# Patient Record
Sex: FEMALE | Race: WHITE | Employment: UNEMPLOYED | URBAN - METROPOLITAN AREA
[De-identification: names, ages, dates, MRNs, and addresses within clinical notes are randomized per-mention and may not be internally consistent; named-entity substitution may affect disease eponyms.]

---

## 2018-03-06 ENCOUNTER — SURG/PROC ORDERS (OUTPATIENT)
Dept: SURGERY | Age: 38
End: 2018-03-06

## 2018-03-06 ENCOUNTER — INITIAL CONSULT (OUTPATIENT)
Dept: SURGERY | Age: 38
End: 2018-03-06

## 2018-03-06 VITALS
HEIGHT: 64 IN | BODY MASS INDEX: 37.9 KG/M2 | SYSTOLIC BLOOD PRESSURE: 120 MMHG | WEIGHT: 222 LBS | DIASTOLIC BLOOD PRESSURE: 72 MMHG

## 2018-03-06 DIAGNOSIS — K80.20 GALLSTONES: Primary | ICD-10-CM

## 2018-03-06 PROCEDURE — 99203 OFFICE O/P NEW LOW 30 MIN: CPT | Performed by: SURGERY

## 2018-03-06 PROCEDURE — G8417 CALC BMI ABV UP PARAM F/U: HCPCS | Performed by: SURGERY

## 2018-03-06 PROCEDURE — G8484 FLU IMMUNIZE NO ADMIN: HCPCS | Performed by: SURGERY

## 2018-03-06 PROCEDURE — G8427 DOCREV CUR MEDS BY ELIG CLIN: HCPCS | Performed by: SURGERY

## 2018-03-06 RX ORDER — HEPARIN SODIUM 5000 [USP'U]/ML
5000 INJECTION, SOLUTION INTRAVENOUS; SUBCUTANEOUS ONCE
Status: CANCELLED | OUTPATIENT
Start: 2018-03-06

## 2018-03-06 RX ORDER — SODIUM CHLORIDE 0.9 % (FLUSH) 0.9 %
10 SYRINGE (ML) INJECTION EVERY 12 HOURS SCHEDULED
Status: CANCELLED | OUTPATIENT
Start: 2018-03-06

## 2018-03-06 RX ORDER — SODIUM CHLORIDE 0.9 % (FLUSH) 0.9 %
10 SYRINGE (ML) INJECTION PRN
Status: CANCELLED | OUTPATIENT
Start: 2018-03-06

## 2018-03-19 NOTE — PROGRESS NOTES
Endoscopy Procedure Note    Patient: Bull Bateman MRN: [id number]  SSN: [id number]    YOB: 1963  Age: 52 y.o. Sex: female      Date/Time:  1/17/2017 9:05 AM    Esophagogastroduodenoscopy (EGD) Procedure Note    Procedure: Esophagogastroduodenoscopy with biopsy    IMPRESSION:   1. Mild gastritis or gastropathy changes. Biopsies taken for H. Pylori. 2. Otherwise normal post Christian-en-Y gastric bypass anatomy. RECOMMENDATIONS:  1. Resume regular diet. 2. Continue Protonix. 3. Avoid Alcohol and NSAIDs. 4. Will contact with biopsy results in 2 weeks. Indication: Vomiting, persitent of unclear etilogy  :  Demond Edwards MD  Assistants: Endoscopy Technician-1: Karly Garza  Endoscopy RN-1: Patricia Raygoza RN    Referring Provider:   Heidi Fitzpatrick MD  History: The history and physical exam were reviewed and updated. Endoscope: Olympus GIF-190 diagnostic endoscope  Extent of Exam: proximal jejunum  ASA: ASA 3 - Patient with moderate systemic disease with functional limitations  Anethesia/Sedation:  MAC anesthesia    Description of the procedure: The procedure was discussed with the patient including risks, benefits, alternatives including risks of iv sedation, bleeding, perforation and aspiration. A safety timeout was performed. The patient was placed in the left lateral decubitus position. A bite block was placed. The patient was given incremental doses of intravenous sedation until moderate sedation was achieved. The patients vital signs were monitored at all times including heart rate/rhythm, blood pressure and oxygen saturation. The endoscope was then passed under direct visualization to the proximal jejunum. The endoscope was then slowly withdrawn while visualizing the mucosa. In the stomach a retroflexion was performed and gastric fundus and cardia visualized. The endoscope was then slowly withdrawn.   The patient was then transferred to recovery Obstructive Sleep Apnea (DOROTA) Screening     Patient:  Alvin Leos    YOB: 1980      Medical Record #:  [id number]                     Date:  3/19/2018     1. Are you a loud and/or regular snorer? []  Yes       [x] No    2. Have you been observed to gasp or stop breathing during sleep? []  Yes       [x] No    3. Do you feel tired or groggy upon awakening or do you awaken with a headache?           []  Yes       [x] No    4. Are you often tired or fatigued during the wake time hours? []  Yes       [x] No    5. Do you fall asleep sitting, reading, watching TV or driving? []  Yes       [x] No    6. Do you often have problems with memory or concentration? []  Yes       [x] No    **If patient's score is ? 3 they are considered high risk for DOROTA. Notify the anesthesiologist of the high risk and document in focus note. Note:  If the patient's BMI is more than 35 kg m¯² , has neck circumference > 40 cm, and/or high blood pressure the risk is greater (© American Sleep Apnea Association, 2006). in stable condition. Findings:    Esophagus: The esophageal mucosa was normal with no ulceration, mass or stricture. There was no evidence of Jean's esophagus or reflux esophagitis. Stomach: There was anatomy consistent with a Christian-en-Y gastric bypass. There was mild edema of the gastric mucosa. Biopsies taken for H. Pylori. No ulceration, mass, stricture. The Gastrojejunal anastomosis appeared normal with no ulceration, mass or stricture. Jejunum: The duodenum mucosa was normal with no ulceration, mass, stricture and no evidence of villous atrophy. Therapies:  None    Specimens:   ID Type Source Tests Collected by Time Destination   1 : Bx (cold forcep) r/o H Pylori Preservative Gastric  Alysa Antonio MD 1/17/2017 0092 Pathology               Complications:   None; patient tolerated the procedure well. EBL:None    Discharge disposition:  Home in the company of  when able to ambulate    Alysa Antonio MD  January 17, 2017  9:05 AM          Colonoscopy Report    Patient: Mis Chavarria MRN: [id number]  SSN: [id number]    YOB: 1963  Age: 52 y.o. Sex: female      Date of Procedure: 1/17/2017    IMPRESSION:  1. Internal hemorrhoids. 2. Otherwise normal colonoscopy including terminal ileum evaluation. RECOMMENDATIONS:  1. Resume regular diet, Recommend high fiber. 2. Repeat colonoscopy in 5 years.      Indication:  Personal history of colon polyps (screening only)  Procedure Performed: Colonoscopy   Endoscopist: Alysa Antonio MD  Assistant: Endoscopy Technician-1: Narinder Zafar  Endoscopy RN-1: Jones Weeks RN  ASA: ASA 3 - Patient with moderate systemic disease with functional limitations  Mallampati Score: II (soft palate, uvula, fauces visible)  Anesthesia: MAC anesthesia  Endoscope:     [x]  CF H 190AL   []  PCF H190AL   []  GIF H 190    Extent of Examination:terminal ileum  Quality of Preparation:     []  Excellent   [x]  Very Good   [] Fair but adequate   [] Fair, inadequate   []  Poor      Technique: The procedure was discussed with the patient including risks, benefits, alternatives including risks of IV sedation, bleeding, perforation and missed polyp. A safety timeout was performed. The patient was given incremental doses of intravenous sedation to achieve moderate sedation. The patients vital signs were monitored at all times including heart rate, rhythm, blood pressure and oxygen saturation. The patient was placed in left lateral position. When adequate sedation was achieved a perianal inspection and a digital rectal exam were performed. Video colonoscope was introduced into the rectum and advanced under direct vision up to the terminal ileum. The cecum was identified by IC valve, appendiceal orifice and crows foot. With adequate insufflation and maneuvering of the withdrawing scope, the colonic mucosa was visualized carefully. Retroflexion was performed in the rectum and the distal rectum visualized. The patient tolerated the procedure very well and was transferred to recovery area. Findings:  1. Normal rectal exam.   2. Normal terminal ileum with no ulceration, mass, stricture. 3. There were small hemorrhoids in the distal rectum. 4. The colonic mucosa was otherwise normal with no polyps, masses, ulcerations, stricture.        EBL:None  Specimen:   ID Type Source Tests Collected by Time Destination   1 : Bx (cold forcep) r/o H Pylori Preservative Gastric  North Antoine MD 1/17/2017 8953 Pathology       North Antoine MD  January 17, 2017  9:08 AM

## 2018-03-23 ENCOUNTER — HOSPITAL ENCOUNTER (OUTPATIENT)
Dept: SURGERY | Age: 38
Discharge: HOME | End: 2018-03-23
Attending: SURGERY | Admitting: SURGERY

## 2018-03-23 VITALS
RESPIRATION RATE: 15 BRPM | TEMPERATURE: 97.6 F | OXYGEN SATURATION: 93 % | DIASTOLIC BLOOD PRESSURE: 83 MMHG | SYSTOLIC BLOOD PRESSURE: 128 MMHG | BODY MASS INDEX: 37.9 KG/M2 | HEIGHT: 64 IN | WEIGHT: 222 LBS | HEART RATE: 77 BPM

## 2018-03-23 DIAGNOSIS — K80.20 GALLSTONES: Primary | ICD-10-CM

## 2018-03-23 PROCEDURE — 47562 LAPAROSCOPIC CHOLECYSTECTOMY: CPT | Performed by: SURGERY

## 2018-03-23 RX ORDER — OXYCODONE HYDROCHLORIDE 5 MG/1
5 TABLET ORAL EVERY 6 HOURS PRN
Qty: 28 TABLET | Refills: 0 | Status: SHIPPED | OUTPATIENT
Start: 2018-03-23 | End: 2018-03-30

## 2018-03-23 RX ORDER — LIDOCAINE HYDROCHLORIDE 10 MG/ML
INJECTION, SOLUTION EPIDURAL; INFILTRATION; INTRACAUDAL; PERINEURAL
Status: DISPENSED
Start: 2018-03-23 | End: 2018-03-23

## 2018-03-23 RX ORDER — FENTANYL CITRATE 50 UG/ML
25 INJECTION, SOLUTION INTRAMUSCULAR; INTRAVENOUS EVERY 5 MIN PRN
Status: DISCONTINUED | OUTPATIENT
Start: 2018-03-23 | End: 2018-03-24 | Stop reason: HOSPADM

## 2018-03-23 RX ORDER — HYDRALAZINE HYDROCHLORIDE 20 MG/ML
5 INJECTION INTRAMUSCULAR; INTRAVENOUS EVERY 10 MIN PRN
Status: DISCONTINUED | OUTPATIENT
Start: 2018-03-23 | End: 2018-03-24 | Stop reason: HOSPADM

## 2018-03-23 RX ORDER — ACETAMINOPHEN 10 MG/ML
1000 INJECTION, SOLUTION INTRAVENOUS ONCE
Status: DISCONTINUED | OUTPATIENT
Start: 2018-03-23 | End: 2018-03-24 | Stop reason: HOSPADM

## 2018-03-23 RX ORDER — CELECOXIB 100 MG/1
100 CAPSULE ORAL ONCE
Status: COMPLETED | OUTPATIENT
Start: 2018-03-23 | End: 2018-03-23

## 2018-03-23 RX ORDER — HEPARIN SODIUM 5000 [USP'U]/ML
5000 INJECTION, SOLUTION INTRAVENOUS; SUBCUTANEOUS ONCE
Status: COMPLETED | OUTPATIENT
Start: 2018-03-23 | End: 2018-03-23

## 2018-03-23 RX ORDER — SODIUM CHLORIDE, SODIUM LACTATE, POTASSIUM CHLORIDE, CALCIUM CHLORIDE 600; 310; 30; 20 MG/100ML; MG/100ML; MG/100ML; MG/100ML
INJECTION, SOLUTION INTRAVENOUS CONTINUOUS
Status: DISCONTINUED | OUTPATIENT
Start: 2018-03-23 | End: 2018-03-24 | Stop reason: HOSPADM

## 2018-03-23 RX ORDER — PROMETHAZINE HYDROCHLORIDE 25 MG/ML
6.25 INJECTION, SOLUTION INTRAMUSCULAR; INTRAVENOUS
Status: ACTIVE | OUTPATIENT
Start: 2018-03-23 | End: 2018-03-23

## 2018-03-23 RX ORDER — ACETAMINOPHEN 10 MG/ML
INJECTION, SOLUTION INTRAVENOUS
Status: DISPENSED
Start: 2018-03-23 | End: 2018-03-23

## 2018-03-23 RX ORDER — FENTANYL CITRATE 50 UG/ML
50 INJECTION, SOLUTION INTRAMUSCULAR; INTRAVENOUS EVERY 5 MIN PRN
Status: DISCONTINUED | OUTPATIENT
Start: 2018-03-23 | End: 2018-03-24 | Stop reason: HOSPADM

## 2018-03-23 RX ORDER — SODIUM CHLORIDE 0.9 % (FLUSH) 0.9 %
10 SYRINGE (ML) INJECTION EVERY 12 HOURS SCHEDULED
Status: DISCONTINUED | OUTPATIENT
Start: 2018-03-23 | End: 2018-03-24 | Stop reason: HOSPADM

## 2018-03-23 RX ORDER — SODIUM CHLORIDE 0.9 % (FLUSH) 0.9 %
10 SYRINGE (ML) INJECTION PRN
Status: DISCONTINUED | OUTPATIENT
Start: 2018-03-23 | End: 2018-03-24 | Stop reason: HOSPADM

## 2018-03-23 RX ORDER — LABETALOL HYDROCHLORIDE 5 MG/ML
5 INJECTION, SOLUTION INTRAVENOUS EVERY 10 MIN PRN
Status: DISCONTINUED | OUTPATIENT
Start: 2018-03-23 | End: 2018-03-24 | Stop reason: HOSPADM

## 2018-03-23 RX ORDER — ONDANSETRON 2 MG/ML
4 INJECTION INTRAMUSCULAR; INTRAVENOUS
Status: ACTIVE | OUTPATIENT
Start: 2018-03-23 | End: 2018-03-23

## 2018-03-23 RX ADMIN — SODIUM CHLORIDE, SODIUM LACTATE, POTASSIUM CHLORIDE, CALCIUM CHLORIDE: 600; 310; 30; 20 INJECTION, SOLUTION INTRAVENOUS at 07:14

## 2018-03-23 RX ADMIN — Medication 0.25 MG: at 08:55

## 2018-03-23 RX ADMIN — Medication 0.25 MG: at 08:41

## 2018-03-23 RX ADMIN — Medication 0.25 MG: at 08:48

## 2018-03-23 RX ADMIN — HEPARIN SODIUM 5000 UNITS: 5000 INJECTION, SOLUTION INTRAVENOUS; SUBCUTANEOUS at 07:14

## 2018-03-23 RX ADMIN — CELECOXIB 100 MG: 100 CAPSULE ORAL at 07:21

## 2018-03-23 RX ADMIN — Medication 0.25 MG: at 08:27

## 2018-03-23 ASSESSMENT — PAIN DESCRIPTION - ORIENTATION: ORIENTATION: RIGHT;UPPER

## 2018-03-23 ASSESSMENT — PAIN DESCRIPTION - LOCATION: LOCATION: ABDOMEN

## 2018-03-23 ASSESSMENT — PAIN SCALES - GENERAL
PAINLEVEL_OUTOF10: 4
PAINLEVEL_OUTOF10: 5
PAINLEVEL_OUTOF10: 6
PAINLEVEL_OUTOF10: 6
PAINLEVEL_OUTOF10: 0
PAINLEVEL_OUTOF10: 6

## 2018-03-23 ASSESSMENT — PAIN DESCRIPTION - DESCRIPTORS: DESCRIPTORS: SORE

## 2018-03-23 ASSESSMENT — PAIN DESCRIPTION - PAIN TYPE: TYPE: SURGICAL PAIN

## 2018-03-23 ASSESSMENT — PAIN - FUNCTIONAL ASSESSMENT: PAIN_FUNCTIONAL_ASSESSMENT: 0-10

## 2018-03-23 NOTE — H&P
I have reviewed the progress note serving as history and physical dated March/6/ 2018 and examined the patient and find no relevant changes. I have reviewed with the patient and/or family the risks, benefits, and alternatives to the procedure.

## 2018-03-23 NOTE — PLAN OF CARE
Phase I (PACU)  1. Patient is identified using name and the date of birth. 2.  The patient is free from signs and symptoms of chemical, electrical, laser, radiation, positioning, or transfer/transport injury. 3.  The patient receives appropriate medication(s), safely administered during the Perioperative period. 4.  The patient has wound/tissue perfusion consistent with or improved from baseline levels established preoperatively. 5.  The patient is at or returning to normothermia at the conclusion of the immediate postoperative period. 6.  The patient's fluid, electrolyte, and acid base balances are consistent with or improved from baseline levels established preoperatively. 7.  The patient's pulmonary function is consistent with or improved from baseline levels established preoperatively. 8.  The patient's cardiovascular status is consistent with or improved from baseline levels established preoperatively. 9.  The patient/caregiver participates in decisions affecting his or her Perioperative plan of care. 10. The patient's care is consistent with the individualized Perioperative plan of care. 11. The patient's right to privacy is maintained. 12. The patient is the recipient of competent and ethical care within legal standards of practice. 13.  The patient's value system, lifestyle, ethnicity, and culture are considered, respected, and incorporated in the Perioperative plan of care. 14.  The patient demonstrates and/or reports adequate pain control throughout the the Perioperative period. 15. The patient's neurological status is consistent with or improved from baseline levels established preoperatively. 16.  The patient/caregiver demonstrates knowledge of the expected responses to the operative or invasive procedure. 17.  Patient/Caregiver has reduced anxiety. Interventions- Familiarize with environment and equipment.   18.  Other:  19.Other:
Pre-Operative:  1. Patient/Caregiver identifies - states name and date of birth. 2.  The patient is free from signs and symptoms of injury. 3.  The patient receives appropriate medication(s), safely administered during the Perioperative period. 4.  The patient is free from signs and symptoms of infection. 5.  The patient has wound / tissue perfusion. 6.  The patients's fluid, electrolyte, and acid-base balances are established preoperatively. 7.  The patient's pulmonary function is established preoperatively. 8.  The patient's cardiovascular status is established preoperatively. 9.  The patient / caregiver demonstrates knowledge of nutritional management related to the operative or other invasive procedure. 10. The patient/caregiver demonstrates knowledge of medication management. 11. The patient/caregiver demonstrates knowledge of pain management. 12.  The patient participates in the rehabilitation process as applicable. 13.  The patient/caregiver participates in decisions affection his or her Perioperative plan of care. 14.  The patient's care is consistent with the individualized Perioperative plan of care. 15.  The patient's right to privacy is maintained. 16.  The patient is the recipient of competent and ethical care within legal standards of practice. 17.  The patient's value system, lifestyle, ethnicity, and culture are considered, respected, and incorporated in the Perioperative plan of care and understands special services available. 18.  The patient demonstrates and/or reports adequate pain control throughout the the Perioperative period. 19. The patient's neurological status is established preoperatively. 20. The patient/caregiver demonstrates knowledge of the expected responses to the operative or invasive procedure. 21.  Patient/Caregiver has reduced anxiety. Interventions- Familiarize with environment and equipment.   22. Patient/Caregiver verbalizes understanding of Phase I
baseline levels established preoperatively. 23.  The patient/caregiver demonstrates knowledge of the expected responses to the operative or invasive procedure. 20.  Patient/Caregiver has reduced anxiety. Interventions- Familiarize with environment and equipment.   21. Other:  22. Other:

## 2018-03-23 NOTE — ANESTHESIA PRE-OP
complication of hip prosthesis ([facility] [address] [facility] 8/21/2012    Morbid obesity ([facility]     Myotonic muscular dystrophy ([facility] [address]       PERSONAL/FAMILY ANESTHESIA PROBLEMS: no     AIRWAY ASSESSMENT:  MALLAMPATI: I DENTITION: no chipped or loose teeth ROM: full     ANESTHETIC PLAN: GA with standard ASA monitors     If isatu-operative block planned, describe:    CONSENT: Risks/benefits/options/questions discussed.  Patient agrees:  yes

## 2019-05-23 ENCOUNTER — OFFICE VISIT (OUTPATIENT)
Dept: ORTHOPEDIC SURGERY | Age: 39
End: 2019-05-23
Payer: MEDICARE

## 2019-05-23 VITALS — HEIGHT: 64 IN | WEIGHT: 215 LBS | BODY MASS INDEX: 36.7 KG/M2

## 2019-05-23 DIAGNOSIS — Z96.642 HISTORY OF TOTAL HIP REPLACEMENT, LEFT: ICD-10-CM

## 2019-05-23 DIAGNOSIS — M70.62 GREATER TROCHANTERIC BURSITIS OF LEFT HIP: ICD-10-CM

## 2019-05-23 DIAGNOSIS — M25.552 PAIN OF LEFT HIP JOINT: Primary | ICD-10-CM

## 2019-05-23 PROCEDURE — G8427 DOCREV CUR MEDS BY ELIG CLIN: HCPCS | Performed by: ORTHOPAEDIC SURGERY

## 2019-05-23 PROCEDURE — 1036F TOBACCO NON-USER: CPT | Performed by: ORTHOPAEDIC SURGERY

## 2019-05-23 PROCEDURE — G8417 CALC BMI ABV UP PARAM F/U: HCPCS | Performed by: ORTHOPAEDIC SURGERY

## 2019-05-23 PROCEDURE — 99203 OFFICE O/P NEW LOW 30 MIN: CPT | Performed by: ORTHOPAEDIC SURGERY

## 2019-05-23 RX ORDER — DICLOFENAC SODIUM 75 MG/1
75 TABLET, DELAYED RELEASE ORAL 2 TIMES DAILY
Qty: 60 TABLET | Refills: 3 | Status: SHIPPED | OUTPATIENT
Start: 2019-05-23

## 2019-05-23 NOTE — PROGRESS NOTES
Chief Complaint    Hip Pain (lt hip ongoing pain, hx of THR  many years ago; lateral and groin pain)      History of Present Illness:  Sharmaine Siegel is a 34 y.o. female who comes in today for evaluation treatment of a new onset of left lateral hip pain. Patient is a long, complicated history of regards to her hips and had bilateral hip replacements at a young age. Eventually went on to have a revision right total hip replacement. Over the last 4-6 months she's been having some increasing left lateral hip and thigh pain. She does report a fall that she had back in December but landed mostly on her right side and did not have any left hip pain at the time of that injury. The symptoms developed several months after. She now complains of pain that is fairly constant and increases with activity. She denies any complaints of any numbness and tingling and no radiating symptoms. She denies any fevers, chills, or other constitutional symptoms    Past medical history is significant for muscular dystrophy and diabetes.     Pain Assessment  Location of Pain: Pelvis  Location Modifiers: Left  Severity of Pain: 8  Frequency of Pain: Intermittent  Aggravating Factors: Standing, Walking  Limiting Behavior: Some  Work-Related Injury: No  Are there other pain locations you wish to document?: No]           Medical History:  Past Medical History:   Diagnosis Date    Allergic rhinitis     Alopecia     Asthma     Fatigue     GERD (gastroesophageal reflux disease)     Mechanical complication of hip prosthesis ([address] 8/21/2012    Morbid obesity (HCC)     Myotonic muscular dystrophy ([address]      Patient Active Problem List    Diagnosis Date Noted    Gallstones     Mechanical complication of hip prosthesis ([address] 08/21/2012    Right THRevision of acetabulum 08/21/2012    Myotonic muscular dystrophy ([address] 10/19/2011    Pacemaker 10/19/2011    Tonsillar hypertrophy 04/21/2010    Seasonal allergies 04/21/2010     Current Outpatient Medications   Medication Sig Dispense Refill    ondansetron (ZOFRAN ODT) 4 MG disintegrating tablet Take 1 tablet by mouth every 8 hours as needed for Nausea 20 tablet 0    norethindrone-ethinyl estradiol (FEMHRT LOW DOSE) 0.5-2.5 MG-MCG per tablet Take 1 tablet by mouth daily. No current facility-administered medications for this visit. Review of Systems:  Relevant review of systems reviewed and available in the patient's chart    Vital Signs: There were no vitals filed for this visit. General Exam:   Constitutional: Patient is adequately groomed with no evidence of malnutrition  DTRs: Deep tendon reflexes are intact  Mental Status: The patient is oriented to time, place and person. The patient's mood and affect are appropriate. Lymphatic: The lymphatic examination bilaterally reveals all areas to be without enlargement or induration. Vascular: Examination reveals no swelling or calf tenderness. Peripheral pulses are palpable and 2+. Neurological: The patient has good coordination. There is no weakness or sensory deficit. Left Hip Examination:    Inspection:  No erythema swelling or signs of infection. There is a well-healed surgical incision. Palpation:  Tenderness to palpation of the lateral aspect over the greater trochanter    Range of Motion:  Full range of motion but does have some discomfort laterally at the hip    Strength:  5/5 hip flexion and abduction and adduction    Special Tests:  Positive Dimas's test.     Skin: There are no rashes, ulcerations or lesions. Gait: Normal    Reflex 2+ patellar    Additional Comments:       Additional Examinations:         Right Lower Extremity: Examination of the right lower extremity does not show any tenderness, deformity or injury. Range of motion is unremarkable. There is no gross instability. There are no rashes, ulcerations or lesions.   Strength and tone are normal.    Radiology:     X-rays obtained and reviewed in office:  Views 2  Location left hip  Impression there is good alignment of the left hip prosthesis. No evidence of a septic or aseptic loosening. No periprosthetic fractures. Impression:  Encounter Diagnoses   Name Primary?  Pain of left hip joint Yes    Greater trochanteric bursitis of left hip     History of total hip replacement, left        Office Procedures:  Orders Placed This Encounter   Procedures    XR HIP LEFT (2-3 VIEWS)     Standing Status:   Future     Number of Occurrences:   1     Standing Expiration Date:   5/22/2020   [facility] Physical Therapy [facility]     Referral Priority:   Routine     Referral Type:   Eval and Treat     Referral Reason:   Specialty Services Required     Requested Specialty:   Physical Therapy     Number of Visits Requested:   1       Treatment Plan: We reviewed the diagnosis and treatment options. We recommended a course of therapeutic exercises and anti-inflammatory medications. I recommend avoiding any cortisone injections because of her diabetes and risk for infection. We'll have her follow up in 4-6 weeks for repeat clinical exam.  If she still symptomatic at that time a septic/aseptic workup would be recommended.

## 2019-05-31 ENCOUNTER — HOSPITAL ENCOUNTER (OUTPATIENT)
Dept: PHYSICAL THERAPY | Age: 39
Setting detail: THERAPIES SERIES
Discharge: HOME | End: 2019-05-31
Payer: MEDICARE

## 2019-05-31 PROCEDURE — 97161 PT EVAL LOW COMPLEX 20 MIN: CPT | Performed by: PHYSICAL THERAPIST

## 2019-05-31 PROCEDURE — 97110 THERAPEUTIC EXERCISES: CPT | Performed by: PHYSICAL THERAPIST

## 2019-05-31 PROCEDURE — 97140 MANUAL THERAPY 1/> REGIONS: CPT | Performed by: PHYSICAL THERAPIST

## 2019-05-31 NOTE — FLOWSHEET NOTE
[facility], [address]  Phone: [phone]  Fax [phone]    Physical Therapy Daily Treatment Note  Date:  2019    Patient Name:  Maya Hay    :  1980  MRN: [id number]  Restrictions/Precautions:    Physician Information:  Referring Practitioner: Dr. Desiree Ling  Medical/Treatment Diagnosis Information:  · Diagnosis: left hip pain  M25.552, Left greater trochanteric bursitis  M70.62    s/p Left THR  · Treatment Diagnosis: Left hip pain M25.552  [x] Conservative / [] Surgical - DOS:  Therapy Diagnosis/Practice Pattern:  Practice Pattern E: Localized Inflammation  Insurance/Certification information:  PT Insurance Information: [address] [place] [address] signed: [] YES  [] NO  Number of Comorbidities:  []0     []1-2    [x]3+  Date of Patient follow up with Physician:     G-Code (if applicable):      Date G-Code Applied:  19   LEFS  71%    Progress Note: [x]  Yes  []  No  Next due by: Visit #10        Latex Allergy:  [x]NO      []YES  Preferred Language for Healthcare:   [x]English       []other:    Visit # Insurance Allowable Reporting Period   1 MC Begin Date: 2019               End Date:      RECERT DUE BY:     SUBJECTIVE:  See eval    OBJECTIVE: See eval  Observation:  Palpation:     Test used Initial score Current Score   Pain Summary VAS 4-9    Functional questionnaire LEFS 71    ROM flexion      extension     Strength quad      ABD      flexion          RESTRICTIONS/PRECAUTIONS: PACEMAKER,  DM, muscular dystrophy, bilateral THR.       Exercises/Interventions:     Therapeutic Ex Sets/reps Notes        Glut sets X 10  :05 hep   Hip iso abd/ add  X 10  ;05 hep   bridges X 10 hep   TA X 10  ;05 hep   Bkfo X 15  hep   Supine HS s 3x  ;30 hep   Modified fig 4 3x  30 hep                                                Manual Intervention          Roller s 3 min    PROM of bilat hip 3 min    Man HS  3 min              NMR re-education                                                      Therapeutic Exercise and NMR EXR  [x] (70420) Provided verbal/tactile cueing for activities related to strengthening, flexibility, endurance, ROM for improvements in LE, proximal hip, and core control with self care, mobility, lifting, ambulation.  [] (91599) Provided verbal/tactile cueing for activities related to improving balance, coordination, kinesthetic sense, posture, motor skill, proprioception  to assist with LE, proximal hip, and core control in self care, mobility, lifting, ambulation and eccentric single leg control.      NMR and Therapeutic Activities:    [x] (57052 or 29001) Provided verbal/tactile cueing for activities related to improving balance, coordination, kinesthetic sense, posture, motor skill, proprioception and motor activation to allow for proper function of core, proximal hip and LE with self care and ADLs  [] (59656) Gait Re-education- Provided training and instruction to the patient for proper LE, core and proximal hip recruitment and positioning and eccentric body weight control with ambulation re-education including up and down stairs     Home Exercise Program:    [x] (45736) Reviewed/Progressed HEP activities related to strengthening, flexibility, endurance, ROM of core, proximal hip and LE for functional self-care, mobility, lifting and ambulation/stair navigation   [] (31932)Reviewed/Progressed HEP activities related to improving balance, coordination, kinesthetic sense, posture, motor skill, proprioception of core, proximal hip and LE for self care, mobility, lifting, and ambulation/stair navigation      Manual Treatments:  PROM / STM / Oscillations-Mobs:  G-I, II, III, IV (PA's, Inf., Post.)  [] (64355) Provided manual therapy to mobilize LE, proximal hip and/or LS spine soft tissue/joints for the purpose of modulating pain, promoting relaxation,  increasing ROM, reducing/eliminating soft tissue swelling/inflammation/restriction, improving soft tissue extensibility and allowing for proper ROM for normal function with self care, mobility, lifting and ambulation. Modalities:       [] GR/ESU 15 min    [] GR 15 min  [] ESU     [x] CP    [] MHP    [] declined     Charges:  Timed Code Treatment Minutes: 30   Total Treatment Minutes: 60     [x] EVAL (LOW) 99626 (typically 20 minutes face-to-face)  [] EVAL (MOD) 76361 (typically 30 minutes face-to-face)  [] EVAL (HIGH) 34367 (typically 45 minutes face-to-face)  [] RE-EVAL     [x] MF(29527) x  1   [] IONTO  [] NMR (01852) x      [] VASO  [x] Manual (15658) x  1    [] Other:  [] TA x       [] Mech Traction (73147)  [] ES(attended) (66130)      [] ES (un) (02720):     GOALS:   Patient stated goal: abolish hip pain. Therapist goals for Patient:   Short Term Goals: To be achieved in: 2 weeks  1. Independent in HEP and progression per patient tolerance, in order to prevent re-injury. 2. Patient will have a decrease in pain to facilitate improvement in movement, function, and ADLs as indicated by Functional Deficits. Long Term Goals: To be achieved in: 6 weeks  1. Disability index score of 35% or less for the LEFS to assist with reaching prior level of function. 2. Patient will demonstrate increased AROM to pain free at 45 ER and 15 IR to allow for proper joint functioning as indicated by patients Functional Deficits. 3. Patient will demonstrate an increase in Strength to good proximal hip strength and control, within 5lb HHD in LE to allow for proper functional mobility as indicated by patients Functional Deficits. 4. Patient will return to sleeping on either side without increased symptoms or restriction. 5.  Able to stand to cook and walk to grocery shop without pain.      New or Updated Goals (if applicable):  [x] No change to goals established upon initial eval/last progress note:  New Goals:    Progression Towards Functional goals:   [] Patient

## 2019-05-31 NOTE — PLAN OF CARE
[facility], [facility]  [address] [place] [address]  Phone: [phone]  Fax [phone]     Physical Therapy Certification    Dear Referring Practitioner: Dr. Mateus Woodward,    We had the pleasure of evaluating the following patient for physical therapy services at [address] [place] [address]. A summary of our findings can be found in the initial assessment below. This includes our plan of care. If you have any questions or concerns regarding these findings, please do not hesitate to contact me at the office phone number checked above. Thank you for the referral.       Physician Signature:_______________________________Date:__________________  By signing above (or electronic signature), therapists plan is approved by physician    Patient: Tiffany Redd   : 1980   MRN: [id number]  Referring Physician: Referring Practitioner: Dr. Mateus Woodward      Evaluation Date: 2019      Medical Diagnosis Information:  Diagnosis: left hip pain  M25.552, Left greater trochanteric bursitis  M70.62    s/p Left THR   Treatment Diagnosis: Left hip pain  M25.552                                         Insurance information: PT Insurance Information: [facility]       Precautions/ Contra-indications: Left hip  right hip    Right revision  ,   Muscular dystrophy 20 year diagnosis. DM recently diagnosed. PACEMAKER. Latex Allergy:  [x]NO      []YES  Preferred Language for Healthcare:   [x]English       []other:    SUBJECTIVE: Patient stated complaint:pt started having left hip pain 6 months ago. Pt feels she has put more pressure on the left due to right hip weakness. Pt also has MD.  She has foot drop on both feet. Pt does have a cane, but she doesn't use it often. Hip wakes her from sleep. Hip hurts worse in the evening. Sometimes donning and doffing shoes and socks is painful.    Pt would like to got to [facility] this summer. Relevant Medical History: Left hip 2007 right hip 2007   Right revision  2012  Functional Disability Index: LEFS 71%    Pain Scale: 4-9 /10  Easing factors: anti inflammatory,   Heat/ ice. Provocative factors: sleeping, standing, walking, kneeling, position changes, squatting, stairs, sitting, lifting. Type: []Constant   []Intermittent  []Radiating []Localized []other:     Numbness/Tingling: n/a    Occupation/School:disabled    Living Status/Prior Level of Function: Independent with ADLs and IADLs, unable to work out due to MD.     OBJECTIVE:     ROM LEFT RIGHT   HIP Flex 105 105   HIP Abd 40 45   HIP Ext     HIP IR     HIP ER 30 pain    Knee ext     Knee Flex     Ankle PF     Ankle DF 0 0   Ankle In     Ankle Ev     Strength  LEFT RIGHT   HIP Flexors 4+ 4+   HIP Abductors     HIP IR 4- 4   Hip ER 4 4   Knee EXT (quad) 5 5   Knee Flex (HS) 5 5   Ankle DF 4- 4-   Ankle EV 4 4          Reflexes/Sensation:    [x]Dermatomes/Myotomes intact    []Reflexes equal and normal bilaterally   []Other:    Joint mobility:    []Normal    [x]Hypo   []Hyper    Palpation: pain along greater trochanter  And tender along ITB     Functional Mobility/Transfers: Indep    Posture: Pt has slight ER on the left    Bandages/Dressings/Incisions: n/a    Gait: (include devices/WB status) Pt has decreased hip abd on there left    Orthopedic Special Tests: Negative Ortiz's                       [x] Patient history, allergies, meds reviewed. Medical chart reviewed. See intake form. Review Of Systems (ROS):  [x]Performed Review of systems (Integumentary, CardioPulmonary, Neurological) by intake and observation. Intake form has been scanned into medical record. Patient has been instructed to contact their primary care physician regarding ROS issues if not already being addressed at this time.       Co-morbidities/Complexities (which will affect course of rehabilitation):   []None           Arthritic conditions []Rheumatoid arthritis (M05.9)  []Osteoarthritis (M19.91)   Cardiovascular conditions   []Hypertension (I10)  []Hyperlipidemia (E78.5)  []Angina pectoris (I20)  []Atherosclerosis (I70)   Musculoskeletal conditions   []Disc pathology   []Congenital spine pathologies   [x]Prior surgical intervention  []Osteoporosis (M81.8)  []Osteopenia (M85.8)   Endocrine conditions   []Hypothyroid (E03.9)  []Hyperthyroid Gastrointestinal conditions   []Constipation (Y45.94)   Metabolic conditions   []Morbid obesity (E66.01)  [x]Diabetes type 1(E10.65) or 2 (E11.65)   []Neuropathy (G60.9)     Pulmonary conditions   []Asthma (J45)  []Coughing   []COPD (J44.9)   Psychological Disorders  []Anxiety (F41.9)  []Depression (F32.9)   []Other:   [x]Other:  Muscular dystrophy        Barriers to/and or personal factors that will affect rehab potential:              []Age  []Sex              []Motivation/Lack of Motivation                        [x]Co-Morbidities              []Cognitive Function, education/learning barriers              []Environmental, home barriers              []profession/work barriers  []past PT/medical experience  []other:  Justification: Pt is fatigued with MD    Falls Risk Assessment (30 days):   [x] Falls Risk assessed and no intervention required.   [] Falls Risk assessed and Patient requires intervention due to being higher risk   TUG score (>12s at risk):     [] Falls education provided, including       G-Codes:   LEFS 71%    ASSESSMENT:   Functional Impairments:     []Noted lumbar/proximal hip/LE joint hypomobility   [x]Decreased LE functional ROM   [x]Decreased core/proximal hip strength and neuromuscular control   [x]Decreased LE functional strength   [x]Reduced balance/proprioceptive control   []other:      Functional Activity Limitations (from functional questionnaire and intake)   []Reduced ability to tolerate prolonged functional positions   [x]Reduced ability or difficulty with changes of positions or transfers between positions   [x]Reduced ability to maintain good posture and demonstrate good body mechanics with sitting, bending, and lifting   [x]Reduced ability to sleep   [x] Reduced ability or tolerance with driving and/or computer work   [x]Reduced ability to perform lifting, carrying tasks   [x]Reduced ability to squat   [x]Reduced ability to forward bend   [x]Reduced ability to ambulate prolonged functional periods/distances/surfaces   [x]Reduced ability to ascend/descend stairs   []Reduced ability to run, hop, cut or jump   []other:    Participation Restrictions   [x]Reduced participation in self care activities   [x]Reduced participation in home management activities   []Reduced participation in work activities   []Reduced participation in social activities. [x]Reduced participation in sport/recreation activities. Classification :    []Signs/symptoms consistent with post-surgical status including decreased ROM, strength and function.    []Signs/symptoms consistent with joint sprain/strain   []Signs/symptoms consistent with patella-femoral syndrome   []Signs/symptoms consistent with knee OA/hip OA   []Signs/symptoms consistent with internal derangement of knee/Hip   []Signs/symptoms consistent with functional hip weakness/NMR control      [x]Signs/symptoms consistent with tendinitis/tendinosis    []signs/symptoms consistent with pathology which may benefit from Dry needling      []other:      Prognosis/Rehab Potential:      []Excellent   []Good    [x]Fair   []Poor    Tolerance of evaluation/treatment:    []Excellent   [x]Good    []Fair   []Poor  Physical Therapy Evaluation Complexity Justification  [x] A history of present problem with:  [] no personal factors and/or comorbidities that impact the plan of care;  []1-2 personal factors and/or comorbidities that impact the plan of care  [x]3 personal factors and/or comorbidities that impact the plan of care  [x] An examination of body systems using standardized tests and measures addressing any of the following: body structures and functions (impairments), activity limitations, and/or participation restrictions;:  [] a total of 1-2 or more elements   [x] a total of 3 or more elements   [] a total of 4 or more elements   [x] A clinical presentation with:  [x] stable and/or uncomplicated characteristics   [] evolving clinical presentation with changing characteristics  [] unstable and unpredictable characteristics;   [x] Clinical decision making of [x] low, [] moderate, [] high complexity using standardized patient assessment instrument and/or measurable assessment of functional outcome. [x] EVAL (LOW) 50747 (typically 20 minutes face-to-face)  [] EVAL (MOD) 99848 (typically 30 minutes face-to-face)  [] EVAL (HIGH) 13071 (typically 45 minutes face-to-face)  [] RE-EVAL       PLAN:   Frequency/Duration:  2 days per week for 6 Weeks:  Interventions:  [x]  Therapeutic exercise including: strength training, ROM, for Lower extremity and core   [x]  NMR activation and proprioception for LE, Glutes and Core   [x]  Manual therapy as indicated for LE, Hip and spine to include: Dry Needling/IASTM, STM, PROM, Gr I-IV mobilizations, manipulation. [x] Modalities as needed that may include: thermal agents, E-stim, Biofeedback, US, iontophoresis as indicated  [x] Patient education on joint protection, postural re-education, activity modification, progression of HEP. HEP instruction: (see scanned forms)    GOALS:  Patient stated goal: abolish hip pain. Therapist goals for Patient:   Short Term Goals: To be achieved in: 2 weeks  1. Independent in HEP and progression per patient tolerance, in order to prevent re-injury. 2. Patient will have a decrease in pain to facilitate improvement in movement, function, and ADLs as indicated by Functional Deficits. Long Term Goals: To be achieved in: 6 weeks  1.  Disability index score of 35% or less for the LEFS to assist with reaching prior level of function. 2. Patient will demonstrate increased AROM to pain free at 45 ER and 15 IR to allow for proper joint functioning as indicated by patients Functional Deficits. 3. Patient will demonstrate an increase in Strength to good proximal hip strength and control, within 5lb HHD in LE to allow for proper functional mobility as indicated by patients Functional Deficits. 4. Patient will return to sleeping on either side without increased symptoms or restriction. 5.  Able to stand to cook and walk to grocery shop without pain.       Electronically signed by:  Ashlee Lee PT

## 2019-06-07 ENCOUNTER — HOSPITAL ENCOUNTER (OUTPATIENT)
Dept: PHYSICAL THERAPY | Age: 39
Setting detail: THERAPIES SERIES
End: 2019-06-07
Payer: MEDICARE

## 2019-06-11 ENCOUNTER — HOSPITAL ENCOUNTER (OUTPATIENT)
Dept: PHYSICAL THERAPY | Age: 39
Setting detail: THERAPIES SERIES
Discharge: HOME | End: 2019-06-11
Payer: MEDICARE

## 2019-06-11 PROCEDURE — 97140 MANUAL THERAPY 1/> REGIONS: CPT | Performed by: PHYSICAL THERAPIST

## 2019-06-11 PROCEDURE — 97110 THERAPEUTIC EXERCISES: CPT | Performed by: PHYSICAL THERAPIST

## 2019-06-11 NOTE — FLOWSHEET NOTE
[facility], [address] [place] [address]  Phone: [phone]  Fax [phone]    Physical Therapy Daily Treatment Note  Date:  2019    Patient Name:  Cortez Black    :  1980  MRN: [id number]  Restrictions/Precautions:    Physician Information:  Referring Practitioner: Dr. Davian Odonnell  Medical/Treatment Diagnosis Information:  · Diagnosis: left hip pain  M25.552, Left greater trochanteric bursitis  M70.62    s/p Left THR  · Treatment Diagnosis: Left hip pain M25.552  [x] Conservative / [] Surgical - DOS:  Therapy Diagnosis/Practice Pattern:  Practice Pattern E: Localized Inflammation  Insurance/Certification information:  PT Insurance Information: [address] [place] [address] signed: [] YES  [] NO  Number of Comorbidities:  []0     []1-2    [x]3+  Date of Patient follow up with Physician:     G-Code (if applicable):      Date G-Code Applied:  19   LEFS  71%    Progress Note: [x]  Yes  []  No  Next due by: Visit #10        Latex Allergy:  [x]NO      []YES  Preferred Language for Healthcare:   [x]English       []other:    Visit # Insurance Allowable Reporting Period   2 MC Begin Date: 2019               End Date:      RECERT DUE BY:     SUBJECTIVE:  Pt has walked around [facility] several days. Fatigue of activity is adding to her pain. OBJECTIVE: See eval  Observation:  Palpation:     Test used Initial score Current Score   Pain Summary VAS 4-9    Functional questionnaire LEFS 71    ROM flexion      extension     Strength quad      ABD      flexion          RESTRICTIONS/PRECAUTIONS: PACEMAKER,  DM, muscular dystrophy, bilateral THR.       Exercises/Interventions:     Therapeutic Ex Sets/reps Notes   bike 5 min    Glut sets X 10  :05 hep   Hip iso abd/ add  X 15  ;05 hep   bridges X 10 hep   TA X 10  ;05 hep   Bkfo X 15  hep   Supine HS s 3x  ;30 hep   Modified fig 4 3x  30 hep   SAQ 2 x 10    clamshells X 20 Seated IR/ ER iso  X 15  ;05    Standing hip abd 2 x 10                         Manual Intervention          Roller s 3 min    PROM of bilat hip 3 min    Man HS  3 min    Prone quad s 3 min         NMR re-education                                                      Therapeutic Exercise and NMR EXR  [x] (69262) Provided verbal/tactile cueing for activities related to strengthening, flexibility, endurance, ROM for improvements in LE, proximal hip, and core control with self care, mobility, lifting, ambulation.  [] (01724) Provided verbal/tactile cueing for activities related to improving balance, coordination, kinesthetic sense, posture, motor skill, proprioception  to assist with LE, proximal hip, and core control in self care, mobility, lifting, ambulation and eccentric single leg control.      NMR and Therapeutic Activities:    [x] (84728 or 11111) Provided verbal/tactile cueing for activities related to improving balance, coordination, kinesthetic sense, posture, motor skill, proprioception and motor activation to allow for proper function of core, proximal hip and LE with self care and ADLs  [] (72958) Gait Re-education- Provided training and instruction to the patient for proper LE, core and proximal hip recruitment and positioning and eccentric body weight control with ambulation re-education including up and down stairs     Home Exercise Program:    [x] (60247) Reviewed/Progressed HEP activities related to strengthening, flexibility, endurance, ROM of core, proximal hip and LE for functional self-care, mobility, lifting and ambulation/stair navigation   [] (08653)Reviewed/Progressed HEP activities related to improving balance, coordination, kinesthetic sense, posture, motor skill, proprioception of core, proximal hip and LE for self care, mobility, lifting, and ambulation/stair navigation      Manual Treatments:  PROM / STM / Oscillations-Mobs:  G-I, II, III, IV (PA's, Inf., Post.)  [] (90804) Provided manual therapy to mobilize LE, proximal hip and/or LS spine soft tissue/joints for the purpose of modulating pain, promoting relaxation,  increasing ROM, reducing/eliminating soft tissue swelling/inflammation/restriction, improving soft tissue extensibility and allowing for proper ROM for normal function with self care, mobility, lifting and ambulation. Modalities:       [] GR/ESU 15 min    [] GR 15 min  [] ESU     [x] CP    [] MHP    [] declined     Charges:  Timed Code Treatment Minutes: 40   Total Treatment Minutes: 55     [] EVAL (LOW) 05074 (typically 20 minutes face-to-face)  [] EVAL (MOD) 55396 (typically 30 minutes face-to-face)  [] EVAL (HIGH) 60442 (typically 45 minutes face-to-face)  [] RE-EVAL     [x] LR(89181) x  1   [] IONTO  [] NMR (48525) x      [] VASO  [x] Manual (96583) x  1    [] Other:  [] TA x       [] Mech Traction (99889)  [] ES(attended) (15091)      [] ES (un) (21053):     GOALS:   Patient stated goal: abolish hip pain. Therapist goals for Patient:   Short Term Goals: To be achieved in: 2 weeks  1. Independent in HEP and progression per patient tolerance, in order to prevent re-injury. 2. Patient will have a decrease in pain to facilitate improvement in movement, function, and ADLs as indicated by Functional Deficits. Long Term Goals: To be achieved in: 6 weeks  1. Disability index score of 35% or less for the LEFS to assist with reaching prior level of function. 2. Patient will demonstrate increased AROM to pain free at 45 ER and 15 IR to allow for proper joint functioning as indicated by patients Functional Deficits. 3. Patient will demonstrate an increase in Strength to good proximal hip strength and control, within 5lb HHD in LE to allow for proper functional mobility as indicated by patients Functional Deficits. 4. Patient will return to sleeping on either side without increased symptoms or restriction.    5.  Able to stand to cook and walk to grocery shop without pain.     New or Updated Goals (if applicable):  [x] No change to goals established upon initial eval/last progress note:  New Goals:    Progression Towards Functional goals:   [] Patient is progressing as expected towards functional goals listed. [] Progression is slowed due to complexities listed. [] Progression has been slowed due to co-morbidities.   [x] Plan just implemented, too soon to assess goals progression  [] Other:     ASSESSMENT:    [] Improvement noted relative to goals:  [] No Improvement noted related to goals:  Summary/Patient's response to treatment: See Eval    Treatment/Activity Tolerance:  [] Patient tolerated treatment well [] Patient limited by fatique  [] Patient limited by pain  [] Patient limited by other medical complications  [] Other:     Prognosis: [x] Good [] Fair  [] Poor    Patient Requires Follow-up: [x] Yes  [] No    PLAN: See eval  [x] Continue per plan of care [] Alter current plan (see comments)  [] Plan of care initiated [] Hold pending MD visit [] Discharge    Electronically signed by: Delmer Dill PT

## 2019-06-14 ENCOUNTER — HOSPITAL ENCOUNTER (OUTPATIENT)
Dept: PHYSICAL THERAPY | Age: 39
Setting detail: THERAPIES SERIES
Discharge: HOME | End: 2019-06-14
Payer: MEDICARE

## 2019-06-14 DIAGNOSIS — M25.552 PAIN OF LEFT HIP JOINT: ICD-10-CM

## 2019-06-14 DIAGNOSIS — M70.62 GREATER TROCHANTERIC BURSITIS OF LEFT HIP: Primary | ICD-10-CM

## 2019-06-14 DIAGNOSIS — Z96.642 HISTORY OF TOTAL HIP REPLACEMENT, LEFT: ICD-10-CM

## 2019-06-14 PROCEDURE — 97140 MANUAL THERAPY 1/> REGIONS: CPT | Performed by: PHYSICAL THERAPIST

## 2019-06-14 PROCEDURE — 97110 THERAPEUTIC EXERCISES: CPT | Performed by: PHYSICAL THERAPIST

## 2019-06-14 NOTE — FLOWSHEET NOTE
[facility], [address] [place] [address]  Phone: [phone]  Fax [phone]    Physical Therapy Daily Treatment Note  Date:  2019    Patient Name:  Clarence Francisco    :  1980  MRN: [id number]  Restrictions/Precautions:    Physician Information:  Referring Practitioner: Dr. Otto Macdonald  Medical/Treatment Diagnosis Information:  · Diagnosis: left hip pain  M25.552, Left greater trochanteric bursitis  M70.62    s/p Left THR  · Treatment Diagnosis: Left hip pain M25.552  [x] Conservative / [] Surgical - DOS:  Therapy Diagnosis/Practice Pattern:  Practice Pattern E: Localized Inflammation  Insurance/Certification information:  PT Insurance Information: [address] [place] [address] signed: [] YES  [] NO  Number of Comorbidities:  []0     []1-2    [x]3+  Date of Patient follow up with Physician:     G-Code (if applicable):      Date G-Code Applied:  19   LEFS  71%    Progress Note: [x]  Yes  []  No  Next due by: Visit #10        Latex Allergy:  [x]NO      []YES  Preferred Language for Healthcare:   [x]English       []other:    Visit # Insurance Allowable Reporting Period   3 MC Begin Date: 2019               End Date:      RECERT DUE BY:     SUBJECTIVE: pt has the worst pain in the AM.  Hard to move in bed. OBJECTIVE: See eval  Observation:  Palpation:     Test used Initial score Current Score   Pain Summary VAS 4-9 7   Functional questionnaire LEFS 71    ROM flexion      extension     Strength quad      ABD      flexion          RESTRICTIONS/PRECAUTIONS: PACEMAKER,  DM, muscular dystrophy, bilateral THR.       Exercises/Interventions:     Therapeutic Ex Sets/reps Notes   bike 5 min    hep   Hip iso abd/ add  X 20  ;05 hep   bridges X 10 hep   hep   Bkfo X 15  hep   Supine HS s 3x  ;30 hep   Modified fig 4 3x  30 hep   SAQ 2 x 10    clamshells X 20    Prone TKE X 15  :05    Seated IR/ ER iso  X 15  ;05    Standing hip abd 2 x 10 with IR    Bent over hip ext 2 x 10    LAQ 2 x 10               Manual Intervention          Roller s 3 min    PROM of bilat hip 3 min    Man HS  3 min    Prone quad s 3 min         NMR re-education                                                      Therapeutic Exercise and NMR EXR  [x] (29207) Provided verbal/tactile cueing for activities related to strengthening, flexibility, endurance, ROM for improvements in LE, proximal hip, and core control with self care, mobility, lifting, ambulation.  [] (89676) Provided verbal/tactile cueing for activities related to improving balance, coordination, kinesthetic sense, posture, motor skill, proprioception  to assist with LE, proximal hip, and core control in self care, mobility, lifting, ambulation and eccentric single leg control.      NMR and Therapeutic Activities:    [x] (26940 or 45716) Provided verbal/tactile cueing for activities related to improving balance, coordination, kinesthetic sense, posture, motor skill, proprioception and motor activation to allow for proper function of core, proximal hip and LE with self care and ADLs  [] (01716) Gait Re-education- Provided training and instruction to the patient for proper LE, core and proximal hip recruitment and positioning and eccentric body weight control with ambulation re-education including up and down stairs     Home Exercise Program:    [x] (86833) Reviewed/Progressed HEP activities related to strengthening, flexibility, endurance, ROM of core, proximal hip and LE for functional self-care, mobility, lifting and ambulation/stair navigation   [] (83934)Reviewed/Progressed HEP activities related to improving balance, coordination, kinesthetic sense, posture, motor skill, proprioception of core, proximal hip and LE for self care, mobility, lifting, and ambulation/stair navigation      Manual Treatments:  PROM / STM / Oscillations-Mobs:  G-I, II, III, IV (PA's, Inf., Post.)  [] (84500) Provided manual therapy to mobilize LE, proximal hip and/or LS spine soft tissue/joints for the purpose of modulating pain, promoting relaxation,  increasing ROM, reducing/eliminating soft tissue swelling/inflammation/restriction, improving soft tissue extensibility and allowing for proper ROM for normal function with self care, mobility, lifting and ambulation. Modalities:       [] GR/ESU 15 min    [] GR 15 min  [] ESU     [x] CP    [] MHP    [] declined  PT HAS PACEMAKER    Charges:  Timed Code Treatment Minutes: 40   Total Treatment Minutes: 55     [] EVAL (LOW) 20365 (typically 20 minutes face-to-face)  [] EVAL (MOD) 17176 (typically 30 minutes face-to-face)  [] EVAL (HIGH) 38691 (typically 45 minutes face-to-face)  [] RE-EVAL     [x] DB(91963) x  2   [] IONTO  [] NMR (33566) x      [] VASO  [x] Manual (53600) x  1    [] Other:  [] TA x       [] Mech Traction (75830)  [] ES(attended) (67662)      [] ES (un) (45630):     GOALS:   Patient stated goal: abolish hip pain. Therapist goals for Patient:   Short Term Goals: To be achieved in: 2 weeks  1. Independent in HEP and progression per patient tolerance, in order to prevent re-injury. 2. Patient will have a decrease in pain to facilitate improvement in movement, function, and ADLs as indicated by Functional Deficits. Long Term Goals: To be achieved in: 6 weeks  1. Disability index score of 35% or less for the LEFS to assist with reaching prior level of function. 2. Patient will demonstrate increased AROM to pain free at 45 ER and 15 IR to allow for proper joint functioning as indicated by patients Functional Deficits. 3. Patient will demonstrate an increase in Strength to good proximal hip strength and control, within 5lb HHD in LE to allow for proper functional mobility as indicated by patients Functional Deficits. 4. Patient will return to sleeping on either side without increased symptoms or restriction.    5.  Able to stand to cook and walk to grocery shop without

## 2019-06-17 ENCOUNTER — HOSPITAL ENCOUNTER (OUTPATIENT)
Dept: PHYSICAL THERAPY | Age: 39
Setting detail: THERAPIES SERIES
Discharge: HOME | End: 2019-06-17
Payer: MEDICARE

## 2019-06-17 PROCEDURE — 97140 MANUAL THERAPY 1/> REGIONS: CPT | Performed by: PHYSICAL THERAPIST

## 2019-06-17 PROCEDURE — 97110 THERAPEUTIC EXERCISES: CPT | Performed by: PHYSICAL THERAPIST

## 2019-06-17 NOTE — FLOWSHEET NOTE
[facility], [address] [place] [address]  Phone: [phone]  Fax [phone]    Physical Therapy Daily Treatment Note  Date:  2019    Patient Name:  Keiry Simons    :  1980  MRN: [id number]  Restrictions/Precautions:    Physician Information:  Referring Practitioner: Dr. Tolu Pruett  Medical/Treatment Diagnosis Information:  · Diagnosis: left hip pain  M25.552, Left greater trochanteric bursitis  M70.62    s/p Left THR  · Treatment Diagnosis: Left hip pain M25.552  [x] Conservative / [] Surgical - DOS:  Therapy Diagnosis/Practice Pattern:  Practice Pattern E: Localized Inflammation  Insurance/Certification information:  PT Insurance Information: [address] [place] [address] signed: [] YES  [] NO  Number of Comorbidities:  []0     []1-2    [x]3+  Date of Patient follow up with Physician:     G-Code (if applicable):      Date G-Code Applied:  19   LEFS  71%    Progress Note: [x]  Yes  []  No  Next due by: Visit #10        Latex Allergy:  [x]NO      []YES  Preferred Language for Healthcare:   [x]English       []other:    Visit # Insurance Allowable Reporting Period   4 MC Begin Date: 2019               End Date:      RECERT DUE BY:     SUBJECTIVE: Pt cont to have discomfort in the iliopsoas, groin and lateral hip. Pt is not using assistive device. Rain has curbed her activity level. Pt uses heat at home. OBJECTIVE: See eval  Observation:  Palpation:     Test used Initial score Current Score   Pain Summary VAS 4-9 7   Functional questionnaire LEFS 71    ROM flexion      extension     Strength quad      ABD      flexion          RESTRICTIONS/PRECAUTIONS: PACEMAKER,  DM, muscular dystrophy, bilateral THR.       Exercises/Interventions:     Therapeutic Ex Sets/reps Notes   bike 6 min    hep   Hip iso abd/ add  X 20  ;05 hep   bridges X 10 hep   hep   Bkfo X 15  hep   Supine HS s 3x  ;30 hep   Modified fig 4 5x  ;10 hep   SAQ 2 x 10    clamshells X 20    Prone TKE X 15  :05    Seated IR/ ER iso  X 15  ;05    Standing hip abd 2 x 10 with IR    Bent over hip ext 2 x 10    LAQ 2 x 10               Manual Intervention          Roller s 3 min    PROM of bilat hip 3 min    Man HS  3 min    Prone quad s 3 min         NMR re-education                                                      Therapeutic Exercise and NMR EXR  [x] (16011) Provided verbal/tactile cueing for activities related to strengthening, flexibility, endurance, ROM for improvements in LE, proximal hip, and core control with self care, mobility, lifting, ambulation.  [] (79555) Provided verbal/tactile cueing for activities related to improving balance, coordination, kinesthetic sense, posture, motor skill, proprioception  to assist with LE, proximal hip, and core control in self care, mobility, lifting, ambulation and eccentric single leg control.      NMR and Therapeutic Activities:    [x] (71204 or 97409) Provided verbal/tactile cueing for activities related to improving balance, coordination, kinesthetic sense, posture, motor skill, proprioception and motor activation to allow for proper function of core, proximal hip and LE with self care and ADLs  [] (05046) Gait Re-education- Provided training and instruction to the patient for proper LE, core and proximal hip recruitment and positioning and eccentric body weight control with ambulation re-education including up and down stairs     Home Exercise Program:    [x] (51229) Reviewed/Progressed HEP activities related to strengthening, flexibility, endurance, ROM of core, proximal hip and LE for functional self-care, mobility, lifting and ambulation/stair navigation   [] (47955)Reviewed/Progressed HEP activities related to improving balance, coordination, kinesthetic sense, posture, motor skill, proprioception of core, proximal hip and LE for self care, mobility, lifting, and ambulation/stair navigation      Manual Treatments:  PROM / STM / Oscillations-Mobs:  G-I, II, III, IV (PA's, Inf., Post.)  [] (18466) Provided manual therapy to mobilize LE, proximal hip and/or LS spine soft tissue/joints for the purpose of modulating pain, promoting relaxation,  increasing ROM, reducing/eliminating soft tissue swelling/inflammation/restriction, improving soft tissue extensibility and allowing for proper ROM for normal function with self care, mobility, lifting and ambulation. Modalities:       [] GR/ESU 15 min    [] GR 15 min  [] ESU     [x] CP    [] MHP    [] declined  PT HAS PACEMAKER    Charges:  Timed Code Treatment Minutes: 40   Total Treatment Minutes: 55     [] EVAL (LOW) 09949 (typically 20 minutes face-to-face)  [] EVAL (MOD) 21884 (typically 30 minutes face-to-face)  [] EVAL (HIGH) 39584 (typically 45 minutes face-to-face)  [] RE-EVAL     [x] SH(12714) x  2   [] IONTO  [] NMR (89517) x      [] VASO  [x] Manual (47167) x  1    [] Other:  [] TA x       [] Mech Traction (70551)  [] ES(attended) (05451)      [] ES (un) (98859):     GOALS:   Patient stated goal: abolish hip pain. Therapist goals for Patient:   Short Term Goals: To be achieved in: 2 weeks  1. Independent in HEP and progression per patient tolerance, in order to prevent re-injury. 2. Patient will have a decrease in pain to facilitate improvement in movement, function, and ADLs as indicated by Functional Deficits. Long Term Goals: To be achieved in: 6 weeks  1. Disability index score of 35% or less for the LEFS to assist with reaching prior level of function. 2. Patient will demonstrate increased AROM to pain free at 45 ER and 15 IR to allow for proper joint functioning as indicated by patients Functional Deficits. 3. Patient will demonstrate an increase in Strength to good proximal hip strength and control, within 5lb HHD in LE to allow for proper functional mobility as indicated by patients Functional Deficits.    4. Patient will return to sleeping on either side without increased symptoms or restriction. 5.  Able to stand to cook and walk to grocery shop without pain. New or Updated Goals (if applicable):  [x] No change to goals established upon initial eval/last progress note:  New Goals:    Progression Towards Functional goals:   [] Patient is progressing as expected towards functional goals listed. [] Progression is slowed due to complexities listed. [] Progression has been slowed due to co-morbidities. [x] Plan just implemented, too soon to assess goals progression  [] Other:     ASSESSMENT:    [] Improvement noted relative to goals:  [] No Improvement noted related to goals:  Summary/Patient's response to treatment: Pt was given prescription for dry needling due to lack of progress with stretching and strengthening alone.      Treatment/Activity Tolerance:  [] Patient tolerated treatment well [] Patient limited by fatique  [] Patient limited by pain  [] Patient limited by other medical complications  [] Other:     Prognosis: [x] Good [] Fair  [] Poor    Patient Requires Follow-up: [x] Yes  [] No    PLAN: See eval  [x] Continue per plan of care [] Alter current plan (see comments)  [] Plan of care initiated [] Hold pending MD visit [] Discharge    Electronically signed by: Nanci Yang PT

## 2019-06-20 ENCOUNTER — HOSPITAL ENCOUNTER (OUTPATIENT)
Dept: PHYSICAL THERAPY | Age: 39
Setting detail: THERAPIES SERIES
Discharge: HOME | End: 2019-06-20
Payer: MEDICARE

## 2019-06-20 PROCEDURE — 97110 THERAPEUTIC EXERCISES: CPT

## 2019-06-20 PROCEDURE — 97140 MANUAL THERAPY 1/> REGIONS: CPT

## 2019-06-20 NOTE — FLOWSHEET NOTE
[facility], [address]  Phone: [phone]  Fax [phone]    Physical Therapy Daily Treatment Note  Date:  2019    Patient Name:  Maya Hay    :  1980  MRN: [id number]  Restrictions/Precautions:    Physician Information:  Referring Practitioner: Dr. Desiree Ling  Medical/Treatment Diagnosis Information:  · Diagnosis: left hip pain  M25.552, Left greater trochanteric bursitis  M70.62    s/p Left THR  · Treatment Diagnosis: Left hip pain M25.552  [x] Conservative / [] Surgical - DOS:  Therapy Diagnosis/Practice Pattern:  Practice Pattern E: Localized Inflammation  Insurance/Certification information:  PT Insurance Information: [address] [place] [address] signed: [] YES  [] NO  Number of Comorbidities:  []0     []1-2    [x]3+  Date of Patient follow up with Physician:     G-Code (if applicable):      Date G-Code Applied:  19   LEFS  71%    Progress Note: [x]  Yes  []  No  Next due by: Visit #10        Latex Allergy:  [x]NO      []YES  Preferred Language for Healthcare:   [x]English       []other:    Visit # Insurance Allowable Reporting Period   5 MC Begin Date: 2019               End Date:      RECERT DUE BY:     SUBJECTIVE: Pt anxious about needling, hopes it will help. OBJECTIVE: See eval  Observation:  Palpation:     Test used Initial score Current Score   Pain Summary VAS 4-9 7   Functional questionnaire LEFS 71    ROM flexion      extension     Strength quad      ABD      flexion          RESTRICTIONS/PRECAUTIONS: PACEMAKER,  DM, muscular dystrophy, bilateral THR.       Exercises/Interventions:     Therapeutic Ex Sets/reps Notes   bike 6 min    hep   Hip iso abd/ add  X 20  ;05 hep   bridges X 10 hep   hep   Bkfo X 15  hep   Supine HS s 3x  ;30 hep   Modified fig 4 5x  ;10  hep   SAQ 2 x 10    clamshells X 20    Prone TKE X 15  :05    Seated IR/ ER iso  X 15  ;05    Standing hip abd 2 x 10 kinesthetic sense, posture, motor skill, proprioception  to assist with LE, proximal hip, and core control in self care, mobility, lifting, ambulation and eccentric single leg control. NMR and Therapeutic Activities:    [x] (35917 or 73101) Provided verbal/tactile cueing for activities related to improving balance, coordination, kinesthetic sense, posture, motor skill, proprioception and motor activation to allow for proper function of core, proximal hip and LE with self care and ADLs  [] (28864) Gait Re-education- Provided training and instruction to the patient for proper LE, core and proximal hip recruitment and positioning and eccentric body weight control with ambulation re-education including up and down stairs     Home Exercise Program:    [x] (36743) Reviewed/Progressed HEP activities related to strengthening, flexibility, endurance, ROM of core, proximal hip and LE for functional self-care, mobility, lifting and ambulation/stair navigation   [] (94113)Reviewed/Progressed HEP activities related to improving balance, coordination, kinesthetic sense, posture, motor skill, proprioception of core, proximal hip and LE for self care, mobility, lifting, and ambulation/stair navigation      Manual Treatments:  PROM / STM / Oscillations-Mobs:  G-I, II, III, IV (PA's, Inf., Post.)  [] (14886) Provided manual therapy to mobilize LE, proximal hip and/or LS spine soft tissue/joints for the purpose of modulating pain, promoting relaxation,  increasing ROM, reducing/eliminating soft tissue swelling/inflammation/restriction, improving soft tissue extensibility and allowing for proper ROM for normal function with self care, mobility, lifting and ambulation.      Modalities:       [] GR/ESU 15 min    [] GR 15 min  [] ESU     [x] CP    [x] MHP    [] declined  PT HAS PACEMAKER    Charges:  Timed Code Treatment Minutes: 55   Total Treatment Minutes: 70     [] EVAL (LOW) 56679 (typically 20 minutes face-to-face)  [] EVAL (MOD) 31620 (typically 30 minutes face-to-face)  [] EVAL (HIGH) 31935 (typically 45 minutes face-to-face)  [] RE-EVAL     [x] NH(88403) x  2   [] IONTO  [] NMR (98146) x      [] VASO  [x] Manual (76014) x  2    [] Other:  [] TA x       [] Mech Traction (39947)  [] ES(attended) (75178)      [] ES (un) (87712):     GOALS:   Patient stated goal: abolish hip pain. Therapist goals for Patient:   Short Term Goals: To be achieved in: 2 weeks  1. Independent in HEP and progression per patient tolerance, in order to prevent re-injury. 2. Patient will have a decrease in pain to facilitate improvement in movement, function, and ADLs as indicated by Functional Deficits. Long Term Goals: To be achieved in: 6 weeks  1. Disability index score of 35% or less for the LEFS to assist with reaching prior level of function. 2. Patient will demonstrate increased AROM to pain free at 45 ER and 15 IR to allow for proper joint functioning as indicated by patients Functional Deficits. 3. Patient will demonstrate an increase in Strength to good proximal hip strength and control, within 5lb HHD in LE to allow for proper functional mobility as indicated by patients Functional Deficits. 4. Patient will return to sleeping on either side without increased symptoms or restriction. 5.  Able to stand to cook and walk to grocery shop without pain. New or Updated Goals (if applicable):  [x] No change to goals established upon initial eval/last progress note:  New Goals:    Progression Towards Functional goals:   [x] Patient is progressing as expected towards functional goals listed. [] Progression is slowed due to complexities listed. [] Progression has been slowed due to co-morbidities.   [] Plan just implemented, too soon to assess goals progression  [] Other:     ASSESSMENT:    [] Improvement noted relative to goals:  [] No Improvement noted related to goals:  Summary/Patient's response to treatment: Pt was given prescription for dry needling due to lack of progress with stretching and strengthening alone.      Treatment/Activity Tolerance:  [x] Patient tolerated treatment well [] Patient limited by fatique  [] Patient limited by pain  [] Patient limited by other medical complications  [] Other:     Prognosis: [x] Good [] Fair  [] Poor    Patient Requires Follow-up: [x] Yes  [] No    PLAN: See eval  [x] Continue per plan of care [] Alter current plan (see comments)  [] Plan of care initiated [] Hold pending MD visit [] Discharge    Electronically signed by: Sarah Mistry, [facility], DPT [id number]

## 2019-06-25 ENCOUNTER — HOSPITAL ENCOUNTER (OUTPATIENT)
Dept: PHYSICAL THERAPY | Age: 39
Setting detail: THERAPIES SERIES
Discharge: HOME | End: 2019-06-25
Payer: MEDICARE

## 2019-06-25 PROCEDURE — 97110 THERAPEUTIC EXERCISES: CPT | Performed by: PHYSICAL THERAPIST

## 2019-06-25 PROCEDURE — 97140 MANUAL THERAPY 1/> REGIONS: CPT | Performed by: PHYSICAL THERAPIST

## 2019-06-25 NOTE — FLOWSHEET NOTE
[facility], [address] [place]  Phone: [phone]  Fax [phone]    Physical Therapy Daily Treatment Note  Date:  2019    Patient Name:  Francesca Eastman    :  1980  MRN: [id number]  Restrictions/Precautions:    Physician Information:  Referring Practitioner: Dr. Dick Norton  Medical/Treatment Diagnosis Information:  · Diagnosis: left hip pain  M25.552, Left greater trochanteric bursitis  M70.62    s/p Left THR  · Treatment Diagnosis: Left hip pain M25.552  [x] Conservative / [] Surgical - DOS:  Therapy Diagnosis/Practice Pattern:  Practice Pattern E: Localized Inflammation  Insurance/Certification information:  PT Insurance Information: [address] [place] [address] signed: [] YES  [] NO  Number of Comorbidities:  []0     []1-2    [x]3+  Date of Patient follow up with Physician:     G-Code (if applicable):      Date G-Code Applied:  19   LEFS  71%    Progress Note: [x]  Yes  []  No  Next due by: Visit #10        Latex Allergy:  [x]NO      []YES  Preferred Language for Healthcare:   [x]English       []other:    Visit # Insurance Allowable Reporting Period   6 MC Begin Date: 2019               End Date:      RECERT DUE BY:     SUBJECTIVE: Pt was sore after needling. Pt has difficulty sleeping. Pt has a cane at home, but she is not currently using it. Hard to don and doff socks and shoes. Pt has not been doing HEP. She is getting house ready for a party. OBJECTIVE: See eval  Observation:  Palpation:     Test used Initial score Current Score   Pain Summary VAS 4-9 6   Functional questionnaire LEFS 71    ROM flexion      extension     Strength quad      ABD      flexion          RESTRICTIONS/PRECAUTIONS: PACEMAKER,  DM, muscular dystrophy, bilateral THR.       Exercises/Interventions:     Therapeutic Ex Sets/reps Notes   bike 6 min    Glut sets X 10  :05 hep   Hip iso abd/ add  X 20  ;05 hep   bridges X 15 hep   hep   Bkfo X 15  hep   Supine HS s 3x  ;30 hep   Modified fig 4 5x  ;10  hep   SAQ 2 x 10    clamshells  peach  X 20       Seated IR/ ER iso  X 20  :05    Standing hip abd 2 x 10 with IR    Bent over hip ext 2 x 10    LAQ 2 x 10               Manual Intervention          Roller s 3 min    PROM of bilat hip 3 min    Man HS  3 min    Prone quad s 3 min    Dry needling and education 12 min    NMR re-education                                                    Consent signed 6/20/19 and precautions addressed. See media tab. Muscle  Needle Size Technique Notes IES   Site 1 TFL x 1 0.30 x 60mm [] Pistoning / []  Threading  []  Winding/Coning  []    Site 2 Glute Med x 1 0.30 x 60mm [] Pistoning / []  Threading  []  Winding/Coning  []    Site 3 Piriformis x 2 0.30 x 60mm [] Pistoning / []  Threading  []  Winding/Coning  []    Site 4                    [] Pistoning / []  Threading  []  Winding/Coning  []    Site 5                    [] Pistoning / []  Threading  []  Winding/Coning  []      **The above techniques were used to restore functional range of motion, reduce muscle spasm, and induce healing in the corresponding musculature by means of intramuscular mobilization. Clean Technique was utilized today while applying the Dry needling treatment. The treatment sites where cleaned with 70% solution of isopropyl alcohol. The PT washed their hands and utilized treatment gloves along with hand  prior to inserting the needles.  All needles where removed and discarded in the appropriate sharps container. MD has given verbal and/or written approval for this treatment. **    PACEMAKER NO ESTIM    ** Educated patient on anatomy, trigger point etiology, expectations for TDN (bruising, soreness, etc), outcomes, and recommendations for exercise.  **      Therapeutic Exercise and NMR EXR  [x] (12342) Provided verbal/tactile cueing for activities related to strengthening, flexibility, endurance, ROM for improvements in LE, proximal hip, and core control with self care, mobility, lifting, ambulation.  [] (96320) Provided verbal/tactile cueing for activities related to improving balance, coordination, kinesthetic sense, posture, motor skill, proprioception  to assist with LE, proximal hip, and core control in self care, mobility, lifting, ambulation and eccentric single leg control. NMR and Therapeutic Activities:    [x] (41013 or 99150) Provided verbal/tactile cueing for activities related to improving balance, coordination, kinesthetic sense, posture, motor skill, proprioception and motor activation to allow for proper function of core, proximal hip and LE with self care and ADLs  [] (11668) Gait Re-education- Provided training and instruction to the patient for proper LE, core and proximal hip recruitment and positioning and eccentric body weight control with ambulation re-education including up and down stairs     Home Exercise Program:    [x] (63326) Reviewed/Progressed HEP activities related to strengthening, flexibility, endurance, ROM of core, proximal hip and LE for functional self-care, mobility, lifting and ambulation/stair navigation   [] (92635)Reviewed/Progressed HEP activities related to improving balance, coordination, kinesthetic sense, posture, motor skill, proprioception of core, proximal hip and LE for self care, mobility, lifting, and ambulation/stair navigation      Manual Treatments:  PROM / STM / Oscillations-Mobs:  G-I, II, III, IV (PA's, Inf., Post.)  [] (60275) Provided manual therapy to mobilize LE, proximal hip and/or LS spine soft tissue/joints for the purpose of modulating pain, promoting relaxation,  increasing ROM, reducing/eliminating soft tissue swelling/inflammation/restriction, improving soft tissue extensibility and allowing for proper ROM for normal function with self care, mobility, lifting and ambulation.      Modalities:       [] GR/ESU 15 min    [] GR 15 min  [] ESU     [x] CP    [x] MHP    [] declined  PT HAS PACEMAKER    Charges:  Timed Code Treatment Minutes: 45   Total Treatment Minutes: 60     [] EVAL (LOW) 36754 (typically 20 minutes face-to-face)  [] EVAL (MOD) 48000 (typically 30 minutes face-to-face)  [] EVAL (HIGH) 45845 (typically 45 minutes face-to-face)  [] RE-EVAL     [x] ML(09591) x  2   [] IONTO  [] NMR (62823) x      [] VASO  [x] Manual (65511) x  1    [x] Other: ice  [] TA x       [] Mech Traction (83917)  [] ES(attended) (41397)      [] ES (un) (75929):     GOALS:   Patient stated goal: abolish hip pain. Therapist goals for Patient:   Short Term Goals: To be achieved in: 2 weeks  1. Independent in HEP and progression per patient tolerance, in order to prevent re-injury. 2. Patient will have a decrease in pain to facilitate improvement in movement, function, and ADLs as indicated by Functional Deficits. Long Term Goals: To be achieved in: 6 weeks  1. Disability index score of 35% or less for the LEFS to assist with reaching prior level of function. 2. Patient will demonstrate increased AROM to pain free at 45 ER and 15 IR to allow for proper joint functioning as indicated by patients Functional Deficits. 3. Patient will demonstrate an increase in Strength to good proximal hip strength and control, within 5lb HHD in LE to allow for proper functional mobility as indicated by patients Functional Deficits. 4. Patient will return to sleeping on either side without increased symptoms or restriction. 5.  Able to stand to cook and walk to grocery shop without pain. New or Updated Goals (if applicable):  [x] No change to goals established upon initial eval/last progress note:  New Goals:    Progression Towards Functional goals:   [x] Patient is progressing as expected towards functional goals listed. [] Progression is slowed due to complexities listed. [] Progression has been slowed due to co-morbidities.   [] Plan just implemented, too soon to assess goals progression  [] Other:     ASSESSMENT:    [] Improvement noted relative to goals:  [] No Improvement noted related to goals:  Summary/Patient's response to treatment: Pt was given prescription for dry needling due to lack of progress with stretching and strengthening alone.      Treatment/Activity Tolerance:  [x] Patient tolerated treatment well [] Patient limited by fatique  [] Patient limited by pain  [] Patient limited by other medical complications  [] Other:     Prognosis: [x] Good [] Fair  [] Poor    Patient Requires Follow-up: [x] Yes  [] No    PLAN: See eval  [x] Continue per plan of care [] Alter current plan (see comments)  [] Plan of care initiated [] Hold pending MD visit [] Discharge    Electronically signed by: Sky Mike PT

## 2019-06-28 ENCOUNTER — APPOINTMENT (OUTPATIENT)
Dept: PHYSICAL THERAPY | Age: 39
End: 2019-06-28
Payer: MEDICARE

## 2019-07-01 ENCOUNTER — HOSPITAL ENCOUNTER (OUTPATIENT)
Dept: PHYSICAL THERAPY | Age: 39
Setting detail: THERAPIES SERIES
Discharge: HOME | End: 2019-07-01
Payer: MEDICARE

## 2019-07-01 PROCEDURE — 97140 MANUAL THERAPY 1/> REGIONS: CPT | Performed by: PHYSICAL THERAPIST

## 2019-07-01 PROCEDURE — 97110 THERAPEUTIC EXERCISES: CPT | Performed by: PHYSICAL THERAPIST

## 2019-07-01 NOTE — FLOWSHEET NOTE
control with self care, mobility, lifting, ambulation.  [] (59434) Provided verbal/tactile cueing for activities related to improving balance, coordination, kinesthetic sense, posture, motor skill, proprioception  to assist with LE, proximal hip, and core control in self care, mobility, lifting, ambulation and eccentric single leg control. NMR and Therapeutic Activities:    [x] (23366 or 16685) Provided verbal/tactile cueing for activities related to improving balance, coordination, kinesthetic sense, posture, motor skill, proprioception and motor activation to allow for proper function of core, proximal hip and LE with self care and ADLs  [] (74955) Gait Re-education- Provided training and instruction to the patient for proper LE, core and proximal hip recruitment and positioning and eccentric body weight control with ambulation re-education including up and down stairs     Home Exercise Program:    [x] (67192) Reviewed/Progressed HEP activities related to strengthening, flexibility, endurance, ROM of core, proximal hip and LE for functional self-care, mobility, lifting and ambulation/stair navigation   [] (52633)Reviewed/Progressed HEP activities related to improving balance, coordination, kinesthetic sense, posture, motor skill, proprioception of core, proximal hip and LE for self care, mobility, lifting, and ambulation/stair navigation      Manual Treatments:  PROM / STM / Oscillations-Mobs:  G-I, II, III, IV (PA's, Inf., Post.)  [] (49274) Provided manual therapy to mobilize LE, proximal hip and/or LS spine soft tissue/joints for the purpose of modulating pain, promoting relaxation,  increasing ROM, reducing/eliminating soft tissue swelling/inflammation/restriction, improving soft tissue extensibility and allowing for proper ROM for normal function with self care, mobility, lifting and ambulation.      Modalities:       [] GR/ESU 15 min    [] GR 15 min  [] ESU     [x] CP    [x] MHP    [] declined  PT HAS

## 2019-07-03 ENCOUNTER — APPOINTMENT (OUTPATIENT)
Dept: PHYSICAL THERAPY | Age: 39
End: 2019-07-03
Payer: MEDICARE

## 2019-07-08 ENCOUNTER — HOSPITAL ENCOUNTER (OUTPATIENT)
Dept: PHYSICAL THERAPY | Age: 39
Setting detail: THERAPIES SERIES
End: 2019-07-08
Payer: MEDICARE

## 2019-07-11 ENCOUNTER — HOSPITAL ENCOUNTER (OUTPATIENT)
Dept: PHYSICAL THERAPY | Age: 39
Setting detail: THERAPIES SERIES
End: 2019-07-11
Payer: MEDICARE

## 2019-07-11 ENCOUNTER — OFFICE VISIT (OUTPATIENT)
Dept: ORTHOPEDIC SURGERY | Age: 39
End: 2019-07-11
Payer: MEDICARE

## 2019-07-11 DIAGNOSIS — Z96.642 HISTORY OF TOTAL HIP REPLACEMENT, LEFT: Primary | ICD-10-CM

## 2019-07-11 PROCEDURE — G8417 CALC BMI ABV UP PARAM F/U: HCPCS | Performed by: ORTHOPAEDIC SURGERY

## 2019-07-11 PROCEDURE — G8428 CUR MEDS NOT DOCUMENT: HCPCS | Performed by: ORTHOPAEDIC SURGERY

## 2019-07-11 PROCEDURE — 99213 OFFICE O/P EST LOW 20 MIN: CPT | Performed by: ORTHOPAEDIC SURGERY

## 2019-07-11 PROCEDURE — 1036F TOBACCO NON-USER: CPT | Performed by: ORTHOPAEDIC SURGERY

## 2019-07-30 ENCOUNTER — HOSPITAL ENCOUNTER (OUTPATIENT)
Dept: NUCLEAR MEDICINE | Age: 39
Discharge: HOME | End: 2019-07-30
Payer: MEDICARE

## 2019-07-30 ENCOUNTER — HOSPITAL ENCOUNTER (OUTPATIENT)
Age: 39
Discharge: HOME | End: 2019-07-30
Payer: MEDICARE

## 2019-07-30 DIAGNOSIS — Z96.642 HISTORY OF TOTAL HIP REPLACEMENT, LEFT: ICD-10-CM

## 2019-07-30 LAB
BASOPHILS ABSOLUTE: 0.1 K/UL (ref 0–0.2)
BASOPHILS RELATIVE PERCENT: 0.8 %
C-REACTIVE PROTEIN: 8 MG/L (ref 0–5.1)
EOSINOPHILS ABSOLUTE: 0.1 K/UL (ref 0–0.6)
EOSINOPHILS RELATIVE PERCENT: 1.5 %
HCT VFR BLD CALC: 41.8 % (ref 36–48)
HEMOGLOBIN: 13.6 G/DL (ref 12–16)
LYMPHOCYTES ABSOLUTE: 2.8 K/UL (ref 1–5.1)
LYMPHOCYTES RELATIVE PERCENT: 41 %
MCH RBC QN AUTO: 29.8 PG (ref 26–34)
MCHC RBC AUTO-ENTMCNC: 32.6 G/DL (ref 31–36)
MCV RBC AUTO: 91.4 FL (ref 80–100)
MONOCYTES ABSOLUTE: 0.4 K/UL (ref 0–1.3)
MONOCYTES RELATIVE PERCENT: 6.2 %
NEUTROPHILS ABSOLUTE: 3.5 K/UL (ref 1.7–7.7)
NEUTROPHILS RELATIVE PERCENT: 50.5 %
PDW BLD-RTO: 13.5 % (ref 12.4–15.4)
PLATELET # BLD: 227 K/UL (ref 135–450)
PMV BLD AUTO: 8.7 FL (ref 5–10.5)
RBC # BLD: 4.58 M/UL (ref 4–5.2)
SEDIMENTATION RATE, ERYTHROCYTE: 18 MM/HR (ref 0–20)
WBC # BLD: 6.8 K/UL (ref 4–11)

## 2019-07-30 PROCEDURE — 3430000000 HC RX DIAGNOSTIC RADIOPHARMACEUTICAL: Performed by: ORTHOPAEDIC SURGERY

## 2019-07-30 PROCEDURE — 85652 RBC SED RATE AUTOMATED: CPT

## 2019-07-30 PROCEDURE — 85025 COMPLETE CBC W/AUTO DIFF WBC: CPT

## 2019-07-30 PROCEDURE — A9503 TC99M MEDRONATE: HCPCS | Performed by: ORTHOPAEDIC SURGERY

## 2019-07-30 PROCEDURE — 86140 C-REACTIVE PROTEIN: CPT

## 2019-07-30 PROCEDURE — 78315 BONE IMAGING 3 PHASE: CPT

## 2019-07-30 PROCEDURE — 36415 COLL VENOUS BLD VENIPUNCTURE: CPT

## 2019-07-30 RX ORDER — TC 99M MEDRONATE 20 MG/10ML
25 INJECTION, POWDER, LYOPHILIZED, FOR SOLUTION INTRAVENOUS
Status: COMPLETED | OUTPATIENT
Start: 2019-07-30 | End: 2019-07-30

## 2019-07-30 RX ADMIN — TC 99M MEDRONATE 25 MILLICURIE: 20 INJECTION, POWDER, LYOPHILIZED, FOR SOLUTION INTRAVENOUS at 10:39

## 2019-07-31 ENCOUNTER — TELEPHONE (OUTPATIENT)
Dept: ORTHOPEDIC SURGERY | Age: 39
End: 2019-07-31

## 2023-11-20 ENCOUNTER — HOSPITAL ENCOUNTER (INPATIENT)
Age: 43
LOS: 4 days | Discharge: HOME | DRG: 638 | End: 2023-11-25
Attending: EMERGENCY MEDICINE | Admitting: STUDENT IN AN ORGANIZED HEALTH CARE EDUCATION/TRAINING PROGRAM
Payer: MEDICARE

## 2023-11-20 DIAGNOSIS — E11.11 DIABETIC KETOACIDOSIS WITH COMA ASSOCIATED WITH TYPE 2 DIABETES MELLITUS (HCC): ICD-10-CM

## 2023-11-20 DIAGNOSIS — E13.11 DIABETIC KETOACIDOSIS WITH COMA ASSOCIATED WITH OTHER SPECIFIED DIABETES MELLITUS (HCC): Primary | ICD-10-CM

## 2023-11-20 DIAGNOSIS — R41.82 ALTERED MENTAL STATUS, UNSPECIFIED ALTERED MENTAL STATUS TYPE: ICD-10-CM

## 2023-11-20 PROCEDURE — 93005 ELECTROCARDIOGRAM TRACING: CPT | Performed by: EMERGENCY MEDICINE

## 2023-11-20 PROCEDURE — 2000000000 HC ICU R&B

## 2023-11-20 PROCEDURE — 51702 INSERT TEMP BLADDER CATH: CPT

## 2023-11-20 PROCEDURE — 96374 THER/PROPH/DIAG INJ IV PUSH: CPT

## 2023-11-20 PROCEDURE — 99285 EMERGENCY DEPT VISIT HI MDM: CPT

## 2023-11-20 RX ORDER — OSELTAMIVIR PHOSPHATE 75 MG/1
75 CAPSULE ORAL 2 TIMES DAILY
Status: ON HOLD | COMMUNITY
Start: 2023-11-15 | End: 2023-11-25 | Stop reason: HOSPADM

## 2023-11-20 RX ORDER — FLUCONAZOLE 100 MG/1
TABLET ORAL
Status: ON HOLD | COMMUNITY
Start: 2023-09-03 | End: 2023-11-25 | Stop reason: HOSPADM

## 2023-11-20 RX ORDER — DEXAMETHASONE 6 MG/1
6 TABLET ORAL 2 TIMES DAILY
Status: ON HOLD | COMMUNITY
Start: 2023-11-15 | End: 2023-11-25 | Stop reason: HOSPADM

## 2023-11-20 RX ORDER — DEXTROMETHORPHAN HYDROBROMIDE AND PROMETHAZINE HYDROCHLORIDE 15; 6.25 MG/5ML; MG/5ML
5 SYRUP ORAL EVERY 4 HOURS PRN
COMMUNITY
Start: 2023-11-15

## 2023-11-20 RX ORDER — ACETAMINOPHEN AND CODEINE PHOSPHATE 120; 12 MG/5ML; MG/5ML
SOLUTION ORAL
COMMUNITY

## 2023-11-20 RX ORDER — IPRATROPIUM BROMIDE AND ALBUTEROL SULFATE 2.5; .5 MG/3ML; MG/3ML
3 SOLUTION RESPIRATORY (INHALATION) EVERY 6 HOURS PRN
COMMUNITY
Start: 2023-11-15

## 2023-11-20 RX ORDER — BETAMETHASONE DIPROPIONATE 0.5 MG/G
CREAM TOPICAL 2 TIMES DAILY
COMMUNITY
Start: 2023-06-22

## 2023-11-20 RX ORDER — DULAGLUTIDE 1.5 MG/.5ML
1.5 INJECTION, SOLUTION SUBCUTANEOUS
COMMUNITY
Start: 2023-10-05

## 2023-11-20 RX ORDER — LOSARTAN POTASSIUM 50 MG/1
50 TABLET ORAL DAILY
COMMUNITY
Start: 2023-10-05 | End: 2024-04-02

## 2023-11-20 RX ORDER — ALBUTEROL SULFATE 90 UG/1
2 AEROSOL, METERED RESPIRATORY (INHALATION) 4 TIMES DAILY
COMMUNITY
Start: 2022-12-08

## 2023-11-20 RX ORDER — AZITHROMYCIN 250 MG/1
TABLET, FILM COATED ORAL
Status: ON HOLD | COMMUNITY
Start: 2023-11-15 | End: 2023-11-25 | Stop reason: HOSPADM

## 2023-11-21 ENCOUNTER — APPOINTMENT (OUTPATIENT)
Dept: GENERAL RADIOLOGY | Age: 43
DRG: 638 | End: 2023-11-21
Payer: MEDICARE

## 2023-11-21 ENCOUNTER — APPOINTMENT (OUTPATIENT)
Dept: CT IMAGING | Age: 43
DRG: 638 | End: 2023-11-21
Payer: MEDICARE

## 2023-11-21 PROBLEM — E66.9 CLASS 1 OBESITY IN ADULT: Status: ACTIVE | Noted: 2023-11-21

## 2023-11-21 PROBLEM — G93.40 ACUTE ENCEPHALOPATHY: Status: ACTIVE | Noted: 2023-11-21

## 2023-11-21 PROBLEM — J01.00 ACUTE NON-RECURRENT MAXILLARY SINUSITIS: Status: ACTIVE | Noted: 2023-11-21

## 2023-11-21 PROBLEM — D75.1 SECONDARY POLYCYTHEMIA: Status: ACTIVE | Noted: 2023-11-21

## 2023-11-21 PROBLEM — E11.10 DIABETIC ACIDOSIS (HCC): Status: ACTIVE | Noted: 2023-11-21

## 2023-11-21 PROBLEM — N17.9 AKI (ACUTE KIDNEY INJURY) (HCC): Status: ACTIVE | Noted: 2023-11-21

## 2023-11-21 PROBLEM — E66.811 CLASS 1 OBESITY IN ADULT: Status: ACTIVE | Noted: 2023-11-21

## 2023-11-21 PROBLEM — D72.825 BANDEMIA: Status: ACTIVE | Noted: 2023-11-21

## 2023-11-21 PROBLEM — E11.11 DIABETIC KETOACIDOSIS WITH COMA ASSOCIATED WITH TYPE 2 DIABETES MELLITUS (HCC): Status: ACTIVE | Noted: 2023-11-21

## 2023-11-21 LAB
ALBUMIN SERPL-MCNC: 3.2 G/DL (ref 3.4–5)
ALBUMIN SERPL-MCNC: 3.8 G/DL (ref 3.4–5)
ALBUMIN/GLOB SERPL: 0.8 {RATIO} (ref 1.1–2.2)
ALP SERPL-CCNC: 151 U/L (ref 40–129)
ALP SERPL-CCNC: 170 U/L (ref 40–129)
ALT SERPL-CCNC: 22 U/L (ref 10–40)
ALT SERPL-CCNC: 24 U/L (ref 10–40)
ANION GAP SERPL CALCULATED.3IONS-SCNC: 17 MMOL/L (ref 3–16)
ANION GAP SERPL CALCULATED.3IONS-SCNC: 30 MMOL/L (ref 3–16)
ANION GAP SERPL CALCULATED.3IONS-SCNC: 33 MMOL/L (ref 3–16)
ANION GAP SERPL CALCULATED.3IONS-SCNC: 38 MMOL/L (ref 3–16)
APTT BLD: 21.1 SEC (ref 22.7–35.9)
AST SERPL-CCNC: 17 U/L (ref 15–37)
AST SERPL-CCNC: 21 U/L (ref 15–37)
BACTERIA URNS QL MICRO: ABNORMAL /HPF
BASE EXCESS BLDA CALC-SCNC: -16.5 MMOL/L (ref -3–3)
BASE EXCESS BLDA CALC-SCNC: -27.4 MMOL/L (ref -3–3)
BASOPHILS # BLD: 0 K/UL (ref 0–0.2)
BASOPHILS # BLD: 0 K/UL (ref 0–0.2)
BASOPHILS NFR BLD: 0 %
BASOPHILS NFR BLD: 0 %
BETA-HYDROXYBUTYRATE: >8 MMOL/L (ref 0–0.27)
BILIRUB DIRECT SERPL-MCNC: <0.2 MG/DL (ref 0–0.3)
BILIRUB INDIRECT SERPL-MCNC: ABNORMAL MG/DL (ref 0–1)
BILIRUB SERPL-MCNC: 0.3 MG/DL (ref 0–1)
BILIRUB SERPL-MCNC: <0.2 MG/DL (ref 0–1)
BILIRUB UR QL STRIP.AUTO: ABNORMAL
BUN SERPL-MCNC: 38 MG/DL (ref 7–20)
BUN SERPL-MCNC: 44 MG/DL (ref 7–20)
BUN SERPL-MCNC: 44 MG/DL (ref 7–20)
BUN SERPL-MCNC: 45 MG/DL (ref 7–20)
BUN SERPL-MCNC: 46 MG/DL (ref 7–20)
BUN SERPL-MCNC: 47 MG/DL (ref 7–20)
CALCIUM SERPL-MCNC: 8.4 MG/DL (ref 8.3–10.6)
CALCIUM SERPL-MCNC: 8.9 MG/DL (ref 8.3–10.6)
CALCIUM SERPL-MCNC: 9 MG/DL (ref 8.3–10.6)
CALCIUM SERPL-MCNC: 9.9 MG/DL (ref 8.3–10.6)
CHLORIDE SERPL-SCNC: 104 MMOL/L (ref 99–110)
CHLORIDE SERPL-SCNC: 105 MMOL/L (ref 99–110)
CHLORIDE SERPL-SCNC: 108 MMOL/L (ref 99–110)
CHLORIDE SERPL-SCNC: 117 MMOL/L (ref 99–110)
CHLORIDE SERPL-SCNC: 95 MMOL/L (ref 99–110)
CHLORIDE SERPL-SCNC: 98 MMOL/L (ref 99–110)
CK SERPL-CCNC: 93 U/L (ref 26–192)
CLARITY UR: ABNORMAL
CO2 BLDA-SCNC: 4 MMOL/L
CO2 BLDA-SCNC: 9.3 MMOL/L
CO2 SERPL-SCNC: 14 MMOL/L (ref 21–32)
CO2 SERPL-SCNC: 3 MMOL/L (ref 21–32)
CO2 SERPL-SCNC: 4 MMOL/L (ref 21–32)
CO2 SERPL-SCNC: 4 MMOL/L (ref 21–32)
CO2 SERPL-SCNC: 5 MMOL/L (ref 21–32)
CO2 SERPL-SCNC: 6 MMOL/L (ref 21–32)
COARSE GRAN CASTS #/AREA URNS LPF: ABNORMAL /LPF (ref 0–2)
COHGB MFR BLDA: 0.2 % (ref 0–1.5)
COHGB MFR BLDA: 0.3 % (ref 0–1.5)
COLOR UR: YELLOW
CREAT SERPL-MCNC: 1.3 MG/DL (ref 0.6–1.1)
CREAT SERPL-MCNC: 1.4 MG/DL (ref 0.6–1.1)
CREAT SERPL-MCNC: 1.5 MG/DL (ref 0.6–1.1)
DEPRECATED RDW RBC AUTO: 14.9 % (ref 12.4–15.4)
DEPRECATED RDW RBC AUTO: 15.2 % (ref 12.4–15.4)
EKG ATRIAL RATE: 120 BPM
EKG DIAGNOSIS: NORMAL
EKG Q-T INTERVAL: 354 MS
EKG QRS DURATION: 112 MS
EKG QTC CALCULATION (BAZETT): 565 MS
EKG R AXIS: 56 DEGREES
EKG T AXIS: 30 DEGREES
EKG VENTRICULAR RATE: 153 BPM
EOSINOPHIL # BLD: 0 K/UL (ref 0–0.6)
EOSINOPHIL # BLD: 0 K/UL (ref 0–0.6)
EOSINOPHIL NFR BLD: 0 %
EOSINOPHIL NFR BLD: 0 %
EPI CELLS #/AREA URNS HPF: ABNORMAL /HPF (ref 0–5)
EST. AVERAGE GLUCOSE BLD GHB EST-MCNC: 306.3 MG/DL
EST. AVERAGE GLUCOSE BLD GHB EST-MCNC: 312.1 MG/DL
FINE GRAN CASTS #/AREA URNS HPF: ABNORMAL /LPF (ref 0–2)
GFR SERPLBLD CREATININE-BSD FMLA CKD-EPI: 43 ML/MIN/{1.73_M2}
GFR SERPLBLD CREATININE-BSD FMLA CKD-EPI: 47 ML/MIN/{1.73_M2}
GFR SERPLBLD CREATININE-BSD FMLA CKD-EPI: 52 ML/MIN/{1.73_M2}
GLUCOSE BLD-MCNC: 103 MG/DL (ref 70–99)
GLUCOSE BLD-MCNC: 110 MG/DL (ref 70–99)
GLUCOSE BLD-MCNC: 119 MG/DL (ref 70–99)
GLUCOSE BLD-MCNC: 129 MG/DL (ref 70–99)
GLUCOSE BLD-MCNC: 138 MG/DL (ref 70–99)
GLUCOSE BLD-MCNC: 160 MG/DL (ref 70–99)
GLUCOSE BLD-MCNC: 206 MG/DL (ref 70–99)
GLUCOSE BLD-MCNC: 222 MG/DL (ref 70–99)
GLUCOSE BLD-MCNC: 224 MG/DL (ref 70–99)
GLUCOSE BLD-MCNC: 231 MG/DL (ref 70–99)
GLUCOSE BLD-MCNC: 234 MG/DL (ref 70–99)
GLUCOSE BLD-MCNC: 252 MG/DL (ref 70–99)
GLUCOSE BLD-MCNC: 264 MG/DL (ref 70–99)
GLUCOSE BLD-MCNC: 296 MG/DL (ref 70–99)
GLUCOSE BLD-MCNC: 330 MG/DL (ref 70–99)
GLUCOSE BLD-MCNC: 341 MG/DL (ref 70–99)
GLUCOSE BLD-MCNC: 413 MG/DL (ref 70–99)
GLUCOSE BLD-MCNC: 416 MG/DL (ref 70–99)
GLUCOSE BLD-MCNC: 479 MG/DL (ref 70–99)
GLUCOSE BLD-MCNC: >600 MG/DL (ref 70–99)
GLUCOSE SERPL-MCNC: 280 MG/DL (ref 70–99)
GLUCOSE SERPL-MCNC: 415 MG/DL (ref 70–99)
GLUCOSE SERPL-MCNC: 518 MG/DL (ref 70–99)
GLUCOSE SERPL-MCNC: 519 MG/DL (ref 70–99)
GLUCOSE SERPL-MCNC: 711 MG/DL (ref 70–99)
GLUCOSE SERPL-MCNC: 740 MG/DL (ref 70–99)
GLUCOSE UR STRIP.AUTO-MCNC: >=1000 MG/DL
HBA1C MFR BLD: 12.3 %
HBA1C MFR BLD: 12.5 %
HCO3 BLDA-SCNC: 3.5 MMOL/L (ref 21–29)
HCO3 BLDA-SCNC: 8.6 MMOL/L (ref 21–29)
HCT VFR BLD AUTO: 57.5 % (ref 36–48)
HCT VFR BLD AUTO: 58.9 % (ref 36–48)
HGB BLD-MCNC: 17.9 G/DL (ref 12–16)
HGB BLD-MCNC: 18.1 G/DL (ref 12–16)
HGB BLDA-MCNC: 17.4 G/DL (ref 12–16)
HGB BLDA-MCNC: 19.5 G/DL (ref 12–16)
HGB UR QL STRIP.AUTO: ABNORMAL
HYALINE CASTS #/AREA URNS LPF: ABNORMAL /LPF (ref 0–2)
INR PPP: 1.33 (ref 0.84–1.16)
INR PPP: 1.42 (ref 0.84–1.16)
KETONES UR STRIP.AUTO-MCNC: >=80 MG/DL
LEUKOCYTE ESTERASE UR QL STRIP.AUTO: NEGATIVE
LIPASE SERPL-CCNC: 93 U/L (ref 13–60)
LYMPHOCYTES # BLD: 2.4 K/UL (ref 1–5.1)
LYMPHOCYTES # BLD: 2.6 K/UL (ref 1–5.1)
LYMPHOCYTES NFR BLD: 12 %
LYMPHOCYTES NFR BLD: 9 %
MAGNESIUM SERPL-MCNC: 2.2 MG/DL (ref 1.8–2.4)
MAGNESIUM SERPL-MCNC: 2.7 MG/DL (ref 1.8–2.4)
MAGNESIUM SERPL-MCNC: 2.8 MG/DL (ref 1.8–2.4)
MAGNESIUM SERPL-MCNC: 2.9 MG/DL (ref 1.8–2.4)
MAGNESIUM SERPL-MCNC: 3.1 MG/DL (ref 1.8–2.4)
MCH RBC QN AUTO: 29.7 PG (ref 26–34)
MCH RBC QN AUTO: 29.9 PG (ref 26–34)
MCHC RBC AUTO-ENTMCNC: 30.7 G/DL (ref 31–36)
MCHC RBC AUTO-ENTMCNC: 31.1 G/DL (ref 31–36)
MCV RBC AUTO: 96.1 FL (ref 80–100)
MCV RBC AUTO: 96.7 FL (ref 80–100)
METHGB MFR BLDA: 0.6 %
METHGB MFR BLDA: 0.6 %
MONOCYTES # BLD: 1.8 K/UL (ref 0–1.3)
MONOCYTES # BLD: 2.6 K/UL (ref 0–1.3)
MONOCYTES NFR BLD: 9 %
MONOCYTES NFR BLD: 9 %
NEUTROPHILS # BLD: 16 K/UL (ref 1.7–7.7)
NEUTROPHILS # BLD: 23.3 K/UL (ref 1.7–7.7)
NEUTROPHILS NFR BLD: 62 %
NEUTROPHILS NFR BLD: 76 %
NEUTS BAND NFR BLD MANUAL: 20 % (ref 0–7)
NEUTS BAND NFR BLD MANUAL: 3 % (ref 0–7)
NITRITE UR QL STRIP.AUTO: NEGATIVE
O2 THERAPY: ABNORMAL
O2 THERAPY: ABNORMAL
OSMOLALITY SERPL: 374 MOSM/KG (ref 275–295)
PCO2 BLDA: 16.6 MMHG (ref 35–45)
PCO2 BLDA: 21.1 MMHG (ref 35–45)
PERFORMED ON: ABNORMAL
PH BLDA: 6.94 [PH] (ref 7.35–7.45)
PH BLDA: 7.23 [PH] (ref 7.35–7.45)
PH UR STRIP.AUTO: 6 [PH] (ref 5–8)
PHOSPHATE SERPL-MCNC: 0.5 MG/DL (ref 2.5–4.9)
PHOSPHATE SERPL-MCNC: 2.6 MG/DL (ref 2.5–4.9)
PHOSPHATE SERPL-MCNC: 4.2 MG/DL (ref 2.5–4.9)
PHOSPHATE SERPL-MCNC: 7 MG/DL (ref 2.5–4.9)
PLATELET # BLD AUTO: 397 K/UL (ref 135–450)
PLATELET # BLD AUTO: 463 K/UL (ref 135–450)
PLATELET BLD QL SMEAR: ABNORMAL
PLATELET BLD QL SMEAR: ADEQUATE
PMV BLD AUTO: 7.6 FL (ref 5–10.5)
PMV BLD AUTO: 7.9 FL (ref 5–10.5)
PO2 BLDA: 127.9 MMHG (ref 75–108)
PO2 BLDA: 145.5 MMHG (ref 75–108)
POTASSIUM SERPL-SCNC: 3.6 MMOL/L (ref 3.5–5.1)
POTASSIUM SERPL-SCNC: 4.1 MMOL/L (ref 3.5–5.1)
POTASSIUM SERPL-SCNC: 4.2 MMOL/L (ref 3.5–5.1)
POTASSIUM SERPL-SCNC: 4.2 MMOL/L (ref 3.5–5.1)
POTASSIUM SERPL-SCNC: 5.4 MMOL/L (ref 3.5–5.1)
POTASSIUM SERPL-SCNC: 5.5 MMOL/L (ref 3.5–5.1)
PROCALCITONIN SERPL IA-MCNC: 0.11 NG/ML (ref 0–0.15)
PROT SERPL-MCNC: 7 G/DL (ref 6.4–8.2)
PROT SERPL-MCNC: 7.9 G/DL (ref 6.4–8.2)
PROT UR STRIP.AUTO-MCNC: 100 MG/DL
PROTHROMBIN TIME: 16.5 SEC (ref 11.5–14.8)
PROTHROMBIN TIME: 17.4 SEC (ref 11.5–14.8)
RBC # BLD AUTO: 5.98 M/UL (ref 4–5.2)
RBC # BLD AUTO: 6.09 M/UL (ref 4–5.2)
RBC #/AREA URNS HPF: ABNORMAL /HPF (ref 0–4)
RBC MORPH BLD: NORMAL
RBC MORPH BLD: NORMAL
REASON FOR REJECTION: NORMAL
REJECTED TEST: NORMAL
SAO2 % BLDA: 97.9 %
SAO2 % BLDA: 98.2 %
SLIDE REVIEW: ABNORMAL
SLIDE REVIEW: ABNORMAL
SODIUM SERPL-SCNC: 137 MMOL/L (ref 136–145)
SODIUM SERPL-SCNC: 137 MMOL/L (ref 136–145)
SODIUM SERPL-SCNC: 141 MMOL/L (ref 136–145)
SODIUM SERPL-SCNC: 141 MMOL/L (ref 136–145)
SODIUM SERPL-SCNC: 143 MMOL/L (ref 136–145)
SODIUM SERPL-SCNC: 148 MMOL/L (ref 136–145)
SP GR UR STRIP.AUTO: >=1.03 (ref 1–1.03)
TOXIC GRANULES BLD QL SMEAR: PRESENT
TROPONIN, HIGH SENSITIVITY: 101 NG/L (ref 0–14)
UA DIPSTICK W REFLEX MICRO PNL UR: YES
URN SPEC COLLECT METH UR: ABNORMAL
UROBILINOGEN UR STRIP-ACNC: 0.2 E.U./DL
WBC # BLD AUTO: 20.3 K/UL (ref 4–11)
WBC # BLD AUTO: 28.4 K/UL (ref 4–11)
WBC #/AREA URNS HPF: ABNORMAL /HPF (ref 0–5)

## 2023-11-21 PROCEDURE — 82010 KETONE BODYS QUAN: CPT

## 2023-11-21 PROCEDURE — 2580000003 HC RX 258: Performed by: EMERGENCY MEDICINE

## 2023-11-21 PROCEDURE — 83605 ASSAY OF LACTIC ACID: CPT

## 2023-11-21 PROCEDURE — 85730 THROMBOPLASTIN TIME PARTIAL: CPT

## 2023-11-21 PROCEDURE — 70450 CT HEAD/BRAIN W/O DYE: CPT

## 2023-11-21 PROCEDURE — 6370000000 HC RX 637 (ALT 250 FOR IP): Performed by: EMERGENCY MEDICINE

## 2023-11-21 PROCEDURE — 87040 BLOOD CULTURE FOR BACTERIA: CPT

## 2023-11-21 PROCEDURE — 82550 ASSAY OF CK (CPK): CPT

## 2023-11-21 PROCEDURE — 02HV33Z INSERTION OF INFUSION DEVICE INTO SUPERIOR VENA CAVA, PERCUTANEOUS APPROACH: ICD-10-PCS | Performed by: INTERNAL MEDICINE

## 2023-11-21 PROCEDURE — 36556 INSERT NON-TUNNEL CV CATH: CPT | Performed by: NURSE PRACTITIONER

## 2023-11-21 PROCEDURE — 84484 ASSAY OF TROPONIN QUANT: CPT

## 2023-11-21 PROCEDURE — 2580000003 HC RX 258: Performed by: STUDENT IN AN ORGANIZED HEALTH CARE EDUCATION/TRAINING PROGRAM

## 2023-11-21 PROCEDURE — 85610 PROTHROMBIN TIME: CPT

## 2023-11-21 PROCEDURE — 2000000000 HC ICU R&B

## 2023-11-21 PROCEDURE — C9113 INJ PANTOPRAZOLE SODIUM, VIA: HCPCS | Performed by: INTERNAL MEDICINE

## 2023-11-21 PROCEDURE — 6360000002 HC RX W HCPCS: Performed by: STUDENT IN AN ORGANIZED HEALTH CARE EDUCATION/TRAINING PROGRAM

## 2023-11-21 PROCEDURE — 6360000002 HC RX W HCPCS: Performed by: INTERNAL MEDICINE

## 2023-11-21 PROCEDURE — 83930 ASSAY OF BLOOD OSMOLALITY: CPT

## 2023-11-21 PROCEDURE — 85025 COMPLETE CBC W/AUTO DIFF WBC: CPT

## 2023-11-21 PROCEDURE — 94761 N-INVAS EAR/PLS OXIMETRY MLT: CPT

## 2023-11-21 PROCEDURE — 99291 CRITICAL CARE FIRST HOUR: CPT | Performed by: INTERNAL MEDICINE

## 2023-11-21 PROCEDURE — 80076 HEPATIC FUNCTION PANEL: CPT

## 2023-11-21 PROCEDURE — 81001 URINALYSIS AUTO W/SCOPE: CPT

## 2023-11-21 PROCEDURE — 83036 HEMOGLOBIN GLYCOSYLATED A1C: CPT

## 2023-11-21 PROCEDURE — 36556 INSERT NON-TUNNEL CV CATH: CPT

## 2023-11-21 PROCEDURE — 82803 BLOOD GASES ANY COMBINATION: CPT

## 2023-11-21 PROCEDURE — 80053 COMPREHEN METABOLIC PANEL: CPT

## 2023-11-21 PROCEDURE — 6370000000 HC RX 637 (ALT 250 FOR IP): Performed by: INTERNAL MEDICINE

## 2023-11-21 PROCEDURE — 2700000000 HC OXYGEN THERAPY PER DAY

## 2023-11-21 PROCEDURE — 84100 ASSAY OF PHOSPHORUS: CPT

## 2023-11-21 PROCEDURE — 93010 ELECTROCARDIOGRAM REPORT: CPT | Performed by: INTERNAL MEDICINE

## 2023-11-21 PROCEDURE — 83735 ASSAY OF MAGNESIUM: CPT

## 2023-11-21 PROCEDURE — 2580000003 HC RX 258: Performed by: INTERNAL MEDICINE

## 2023-11-21 PROCEDURE — 36415 COLL VENOUS BLD VENIPUNCTURE: CPT

## 2023-11-21 PROCEDURE — 80048 BASIC METABOLIC PNL TOTAL CA: CPT

## 2023-11-21 PROCEDURE — 83690 ASSAY OF LIPASE: CPT

## 2023-11-21 PROCEDURE — 2500000003 HC RX 250 WO HCPCS: Performed by: INTERNAL MEDICINE

## 2023-11-21 PROCEDURE — 84145 PROCALCITONIN (PCT): CPT

## 2023-11-21 PROCEDURE — 71045 X-RAY EXAM CHEST 1 VIEW: CPT

## 2023-11-21 RX ORDER — POTASSIUM CHLORIDE 7.45 MG/ML
10 INJECTION INTRAVENOUS PRN
Status: DISCONTINUED | OUTPATIENT
Start: 2023-11-21 | End: 2023-11-21

## 2023-11-21 RX ORDER — ONDANSETRON 2 MG/ML
4 INJECTION INTRAMUSCULAR; INTRAVENOUS EVERY 6 HOURS PRN
Status: DISCONTINUED | OUTPATIENT
Start: 2023-11-21 | End: 2023-11-21

## 2023-11-21 RX ORDER — ONDANSETRON 4 MG/1
4 TABLET, ORALLY DISINTEGRATING ORAL EVERY 8 HOURS PRN
Status: DISCONTINUED | OUTPATIENT
Start: 2023-11-21 | End: 2023-11-21

## 2023-11-21 RX ORDER — ENOXAPARIN SODIUM 100 MG/ML
40 INJECTION SUBCUTANEOUS DAILY
Status: DISCONTINUED | OUTPATIENT
Start: 2023-11-21 | End: 2023-11-25 | Stop reason: HOSPADM

## 2023-11-21 RX ORDER — DEXTROSE MONOHYDRATE 100 MG/ML
INJECTION, SOLUTION INTRAVENOUS CONTINUOUS PRN
Status: DISCONTINUED | OUTPATIENT
Start: 2023-11-21 | End: 2023-11-22

## 2023-11-21 RX ORDER — POLYETHYLENE GLYCOL 3350 17 G/17G
17 POWDER, FOR SOLUTION ORAL DAILY PRN
Status: DISCONTINUED | OUTPATIENT
Start: 2023-11-21 | End: 2023-11-25 | Stop reason: HOSPADM

## 2023-11-21 RX ORDER — ACETAMINOPHEN 325 MG/1
650 TABLET ORAL EVERY 6 HOURS PRN
Status: DISCONTINUED | OUTPATIENT
Start: 2023-11-21 | End: 2023-11-25 | Stop reason: HOSPADM

## 2023-11-21 RX ORDER — POTASSIUM CHLORIDE 7.45 MG/ML
10 INJECTION INTRAVENOUS PRN
Status: DISCONTINUED | OUTPATIENT
Start: 2023-11-21 | End: 2023-11-25 | Stop reason: HOSPADM

## 2023-11-21 RX ORDER — MAGNESIUM SULFATE IN WATER 40 MG/ML
2000 INJECTION, SOLUTION INTRAVENOUS PRN
Status: DISCONTINUED | OUTPATIENT
Start: 2023-11-21 | End: 2023-11-25 | Stop reason: HOSPADM

## 2023-11-21 RX ORDER — SODIUM CHLORIDE 0.9 % (FLUSH) 0.9 %
5-40 SYRINGE (ML) INJECTION EVERY 12 HOURS SCHEDULED
Status: DISCONTINUED | OUTPATIENT
Start: 2023-11-21 | End: 2023-11-25 | Stop reason: HOSPADM

## 2023-11-21 RX ORDER — MAGNESIUM SULFATE IN WATER 40 MG/ML
2000 INJECTION, SOLUTION INTRAVENOUS PRN
Status: DISCONTINUED | OUTPATIENT
Start: 2023-11-21 | End: 2023-11-21 | Stop reason: SDUPTHER

## 2023-11-21 RX ORDER — INSULIN LISPRO 100 [IU]/ML
0-4 INJECTION, SOLUTION INTRAVENOUS; SUBCUTANEOUS
Status: DISCONTINUED | OUTPATIENT
Start: 2023-11-21 | End: 2023-11-21

## 2023-11-21 RX ORDER — ACETAMINOPHEN 650 MG/1
650 SUPPOSITORY RECTAL EVERY 6 HOURS PRN
Status: DISCONTINUED | OUTPATIENT
Start: 2023-11-21 | End: 2023-11-25 | Stop reason: HOSPADM

## 2023-11-21 RX ORDER — DEXTROSE AND SODIUM CHLORIDE 5; .45 G/100ML; G/100ML
INJECTION, SOLUTION INTRAVENOUS CONTINUOUS PRN
Status: DISCONTINUED | OUTPATIENT
Start: 2023-11-21 | End: 2023-11-22

## 2023-11-21 RX ORDER — INSULIN LISPRO 100 [IU]/ML
0-4 INJECTION, SOLUTION INTRAVENOUS; SUBCUTANEOUS NIGHTLY
Status: DISCONTINUED | OUTPATIENT
Start: 2023-11-21 | End: 2023-11-21

## 2023-11-21 RX ORDER — SODIUM CHLORIDE 9 MG/ML
INJECTION, SOLUTION INTRAVENOUS CONTINUOUS
Status: DISCONTINUED | OUTPATIENT
Start: 2023-11-21 | End: 2023-11-21

## 2023-11-21 RX ORDER — POTASSIUM CHLORIDE 20 MEQ/1
40 TABLET, EXTENDED RELEASE ORAL PRN
Status: DISCONTINUED | OUTPATIENT
Start: 2023-11-21 | End: 2023-11-21

## 2023-11-21 RX ORDER — POTASSIUM CHLORIDE 7.45 MG/ML
10 INJECTION INTRAVENOUS PRN
Status: DISCONTINUED | OUTPATIENT
Start: 2023-11-21 | End: 2023-11-21 | Stop reason: SDUPTHER

## 2023-11-21 RX ORDER — SODIUM CHLORIDE 9 MG/ML
INJECTION, SOLUTION INTRAVENOUS PRN
Status: DISCONTINUED | OUTPATIENT
Start: 2023-11-21 | End: 2023-11-25 | Stop reason: HOSPADM

## 2023-11-21 RX ORDER — SODIUM CHLORIDE 0.9 % (FLUSH) 0.9 %
5-40 SYRINGE (ML) INJECTION PRN
Status: DISCONTINUED | OUTPATIENT
Start: 2023-11-21 | End: 2023-11-25 | Stop reason: HOSPADM

## 2023-11-21 RX ORDER — SODIUM CHLORIDE AND POTASSIUM CHLORIDE 150; 900 MG/100ML; MG/100ML
INJECTION, SOLUTION INTRAVENOUS CONTINUOUS
Status: DISCONTINUED | OUTPATIENT
Start: 2023-11-21 | End: 2023-11-22

## 2023-11-21 RX ORDER — 0.9 % SODIUM CHLORIDE 0.9 %
2000 INTRAVENOUS SOLUTION INTRAVENOUS ONCE
Status: COMPLETED | OUTPATIENT
Start: 2023-11-21 | End: 2023-11-21

## 2023-11-21 RX ORDER — PANTOPRAZOLE SODIUM 40 MG/10ML
40 INJECTION, POWDER, LYOPHILIZED, FOR SOLUTION INTRAVENOUS DAILY
Status: DISCONTINUED | OUTPATIENT
Start: 2023-11-21 | End: 2023-11-25

## 2023-11-21 RX ORDER — INSULIN LISPRO 100 [IU]/ML
0-4 INJECTION, SOLUTION INTRAVENOUS; SUBCUTANEOUS EVERY 4 HOURS
Status: DISCONTINUED | OUTPATIENT
Start: 2023-11-21 | End: 2023-11-21

## 2023-11-21 RX ADMIN — POTASSIUM CHLORIDE AND SODIUM CHLORIDE: 900; 150 INJECTION, SOLUTION INTRAVENOUS at 11:15

## 2023-11-21 RX ADMIN — SODIUM CHLORIDE 2000 ML: 9 INJECTION, SOLUTION INTRAVENOUS at 00:29

## 2023-11-21 RX ADMIN — AMPICILLIN SODIUM AND SULBACTAM SODIUM 3000 MG: 2; 1 INJECTION, POWDER, FOR SOLUTION INTRAMUSCULAR; INTRAVENOUS at 11:17

## 2023-11-21 RX ADMIN — SODIUM CHLORIDE 22.68 UNITS/HR: 9 INJECTION, SOLUTION INTRAVENOUS at 16:38

## 2023-11-21 RX ADMIN — SODIUM CHLORIDE 10.82 UNITS/HR: 9 INJECTION, SOLUTION INTRAVENOUS at 01:38

## 2023-11-21 RX ADMIN — AMPICILLIN SODIUM AND SULBACTAM SODIUM 3000 MG: 2; 1 INJECTION, POWDER, FOR SOLUTION INTRAMUSCULAR; INTRAVENOUS at 05:46

## 2023-11-21 RX ADMIN — POTASSIUM CHLORIDE AND SODIUM CHLORIDE: 900; 150 INJECTION, SOLUTION INTRAVENOUS at 05:56

## 2023-11-21 RX ADMIN — Medication 10 ML: at 08:15

## 2023-11-21 RX ADMIN — POTASSIUM PHOSPHATE, MONOBASIC AND POTASSIUM PHOSPHATE, DIBASIC 30 MMOL: 224; 236 INJECTION, SOLUTION, CONCENTRATE INTRAVENOUS at 18:14

## 2023-11-21 RX ADMIN — Medication 10 ML: at 20:43

## 2023-11-21 RX ADMIN — MUPIROCIN: 20 OINTMENT TOPICAL at 08:16

## 2023-11-21 RX ADMIN — SODIUM CHLORIDE 4.19 UNITS/HR: 9 INJECTION, SOLUTION INTRAVENOUS at 03:06

## 2023-11-21 RX ADMIN — PANTOPRAZOLE SODIUM 40 MG: 40 INJECTION, POWDER, FOR SOLUTION INTRAVENOUS at 15:30

## 2023-11-21 RX ADMIN — DEXTROSE AND SODIUM CHLORIDE: 5; 450 INJECTION, SOLUTION INTRAVENOUS at 14:23

## 2023-11-21 RX ADMIN — SODIUM CHLORIDE: 9 INJECTION, SOLUTION INTRAVENOUS at 03:04

## 2023-11-21 RX ADMIN — SODIUM CHLORIDE: 9 INJECTION, SOLUTION INTRAVENOUS at 05:40

## 2023-11-21 RX ADMIN — DEXTROSE AND SODIUM CHLORIDE: 5; 450 INJECTION, SOLUTION INTRAVENOUS at 20:44

## 2023-11-21 RX ADMIN — ENOXAPARIN SODIUM 40 MG: 100 INJECTION SUBCUTANEOUS at 08:16

## 2023-11-21 RX ADMIN — AMPICILLIN SODIUM AND SULBACTAM SODIUM 3000 MG: 2; 1 INJECTION, POWDER, FOR SOLUTION INTRAMUSCULAR; INTRAVENOUS at 17:25

## 2023-11-21 ASSESSMENT — PAIN - FUNCTIONAL ASSESSMENT: PAIN_FUNCTIONAL_ASSESSMENT: ADULT NONVERBAL PAIN SCALE (NPVS)

## 2023-11-21 NOTE — PROGRESS NOTES
Patient admitted from ED to ICU bed 231. Pt admit with altered mental status, minimal respond and responds to pain. Patient oriented to room, call light, bed rails, phone, lights and bathroom. Patient instructed about the schedule of the day including: vital sign frequency, lab draws, possible tests, daily weights, and I &O's. Pt not verbalizing understanding d/t ams. Placed pt on ICU monitors. Chlorhexidine wipes per ICU protocol Bed locked, in lowest position, side rails up 3/4, call light within reach. See epic flowsheet for vitals and assessment. See MAR for meds given.

## 2023-11-21 NOTE — PROGRESS NOTES
Chart reviewed. Pt seen in ICU. Pt remains encephalopathic. Friends/family at bedside. Continue current care. Pulm/cc consulted for assistance.

## 2023-11-21 NOTE — CARE COORDINATION
Case Management Assessment  Initial Evaluation    Date/Time of Evaluation: 11/21/2023 9:33 AM  Assessment Completed by: Parminder Roa RN    If patient is discharged prior to next notation, then this note serves as note for discharge by case management. Patient Name: Doreen Davenport                   YOB: 1980  Diagnosis: Altered mental status, unspecified altered mental status type [R41.82]  Diabetic ketoacidosis with coma associated with type 2 diabetes mellitus ([address]) [E11.11]  Diabetic ketoacidosis with coma associated with other specified diabetes mellitus ([address]) [E13.11]                   Date / Time: 11/20/2023 11:51 PM    Patient Admission Status: Inpatient   Readmission Risk (Low < 19, Mod (19-27), High > 27): Readmission Risk Score: 11.2    Current PCP: Kristy Coon MD  PCP verified by ? Yes Kristy Coon MD)    Chart Reviewed: Yes      History Provided by: Medical Record, Other (see comment) (Friends)  Patient Orientation: Unable to Assess (Patient has eyes open but is not engaging in conversation)    Patient Cognition: Other (see comment) (Unable to assess)    Hospitalization in the last 30 days (Readmission):  No    If yes, Readmission Assessment in  Navigator will be completed. Advance Directives:      Code Status: Full Code   Patient's Primary Decision Maker is: Legal Next of Kin      Discharge Planning:    Patient lives with: Spouse/Significant Other Type of Home: House  Primary Care Giver:    Patient Support Systems include: Spouse/Significant Other, Friends/Neighbors   Current Financial resources: Medicare  Current community resources: None  Current services prior to admission: None            Current DME:              Type of Home Care services:  None    ADLS  Prior functional level: Independent in ADLs/IADLs  Current functional level:  Other (see comment) (Patient totally dependent for all ADLS at this time)    PT AM-PAC:   /24  OT AM-PAC:   /24    Family can provide assistance at DC: Yes  Would you like Case Management to discuss the discharge plan with any other family members/significant others, and if so, who? Yes (Will communicate with spouse and Daria Muñoz ( friend))  Plans to Return to Present Housing: Unknown at present  Other Identified Issues/Barriers to RETURNING to current housing: TBD  Potential Assistance needed at discharge: N/A            Potential DME:    Patient expects to discharge to: [address] [place], [place] [address]way for transportation at discharge:      Financial    Payor: [facility] / Plan: [address] / Product Type: *No Product type* /     Does insurance require precert for SNF: Yes    Potential assistance Purchasing Medications: No  Meds-to-Beds request: Yes      [address], [address] [place] [address] [address]  Phone: [phone] Fax: [phone]      Notes:    Factors facilitating achievement of predicted outcomes: Family support and Friend support    Barriers to discharge: Decreased endurance and Medical complications    Additional Case Management Notes: IPTA; Lives in house with spouse; No DMEs at home; Will follow    The Plan for Transition of Care is related to the following treatment goals of Altered mental status, unspecified altered mental status type [R41.82]  Diabetic ketoacidosis with coma associated with type 2 diabetes mellitus ([address]) [E11.11]  Diabetic ketoacidosis with coma associated with other specified diabetes mellitus ([address]) [A82.83]    IF APPLICABLE: The Patient and/or patient representative Angie Barone and her family were provided with a choice of provider and agrees with the discharge plan.  Freedom of choice list with basic dialogue that supports the patient's individualized plan of care/goals and shares the quality data associated with the providers was provided to:     Patient Representative Name:       The Patient and/or Patient Representative Agree with the

## 2023-11-21 NOTE — PLAN OF CARE
Problem: Discharge Planning  Goal: Discharge to home or other facility with appropriate resources  Recent Flowsheet Documentation  Taken 11/21/2023 0800 by Paris James RN  Discharge to home or other facility with appropriate resources:   Arrange for needed discharge resources and transportation as appropriate   Identify barriers to discharge with patient and caregiver     Problem: Pain  Goal: Verbalizes/displays adequate comfort level or baseline comfort level  Recent Flowsheet Documentation  Taken 11/21/2023 0800 by Paris James RN  Verbalizes/displays adequate comfort level or baseline comfort level: Assess pain using appropriate pain scale

## 2023-11-21 NOTE — PROGRESS NOTES
Unable to obtain patients renal panels due to patient having poor access and agitated. JOCELYN Yu at bedside for central line placement.

## 2023-11-21 NOTE — H&P
mouth    Provider, MD Tai   diclofenac (VOLTAREN) 75 MG EC tablet Take 1 tablet by mouth 2 times daily 5/23/19   Melo Oliveira MD   ondansetron (ZOFRAN ODT) 4 MG disintegrating tablet Take 1 tablet by mouth every 8 hours as needed for Nausea 2/23/18   RAJI Adames - CNP   norethindrone-ethinyl estradiol (FEMHRT LOW DOSE) 0.5-2.5 MG-MCG per tablet Take 1 tablet by mouth daily.     Provider, MD Tai       Medications:     Medications:    insulin lispro  0-4 Units SubCUTAneous TID WC    insulin lispro  0-4 Units SubCUTAneous Nightly    insulin lispro  0-4 Units SubCUTAneous Q4H    sodium chloride flush  5-40 mL IntraVENous 2 times per day    enoxaparin  40 mg SubCUTAneous Daily        Infusions:    insulin 7.12 Units/hr (11/21/23 0415)    dextrose 5 % and 0.45 % NaCl      sodium chloride 250 mL/hr at 11/21/23 0304    dextrose      sodium chloride         PRN Meds:   dextrose bolus, 125 mL, PRN   Or  dextrose bolus, 250 mL, PRN  potassium chloride, 10 mEq, PRN  magnesium sulfate, 2,000 mg, PRN  dextrose 5 % and 0.45 % NaCl, , Continuous PRN  glucose, 4 tablet, PRN  dextrose bolus, 125 mL, PRN   Or  dextrose bolus, 250 mL, PRN  glucagon (rDNA), 1 mg, PRN  dextrose, , Continuous PRN  sodium chloride flush, 5-40 mL, PRN  sodium chloride, , PRN  potassium chloride, 40 mEq, PRN   Or  potassium alternative oral replacement, 40 mEq, PRN   Or  potassium chloride, 10 mEq, PRN  polyethylene glycol, 17 g, Daily PRN  acetaminophen, 650 mg, Q6H PRN   Or  acetaminophen, 650 mg, Q6H PRN        Data:     CBC:   Recent Labs     11/21/23  0002   WBC 20.3*   HGB 18.1*   *   MCV 96.7   RDW 15.2   BANDSPCT 3   LYMPHOPCT 12.0   MONOPCT 9.0   BASOPCT 0.0   MONOSABS 1.8*   LYMPHSABS 2.4   EOSABS 0.0   BASOSABS 0.0     CMP:    Recent Labs     11/21/23  0002 11/21/23  0119    137   K 5.5* 5.4*   CL 95* 98*   CO2 4* 6*   BUN 47* 45*   CREATININE 1.5* 1.4*   GLUCOSE 740* 711*   LABALBU 3.8  --    CALCIUM 9.9 8.9 BILITOT 0.3  --    ALKPHOS 170*  --    AST 21  --    ALT 24  --      Lipids: No results found for: \"CHOL\", \"HDL\", \"TRIG\"  Hemoglobin A1C:   Lab Results   Component Value Date/Time    LABA1C 5.9 08/22/2012 04:35 AM     TSH: No results found for: \"TSH\"  Troponin: No results found for: \"TROPONINT\"  BNP: No results for input(s): \"PROBNP\" in the last 72 hours. Lactic Acid: No results for input(s): \"LACTA\" in the last 72 hours. UA:  Lab Results   Component Value Date/Time    NITRU Negative 11/21/2023 12:28 AM    COLORU Yellow 11/21/2023 12:28 AM    PHUR 6.0 11/21/2023 12:28 AM    WBCUA 0-2 11/21/2023 12:28 AM    RBCUA 5-10 11/21/2023 12:28 AM    BACTERIA Rare 11/21/2023 12:28 AM    CLARITYU SL CLOUDY 11/21/2023 12:28 AM    SPECGRAV >=1.030 11/21/2023 12:28 AM    LEUKOCYTESUR Negative 11/21/2023 12:28 AM    UROBILINOGEN 0.2 11/21/2023 12:28 AM    BILIRUBINUR SMALL 11/21/2023 12:28 AM    BLOODU MODERATE 11/21/2023 12:28 AM    GLUCOSEU >=1000 11/21/2023 12:28 AM    KETUA >=80 11/21/2023 12:28 AM     Urine Cultures: No results found for: \"LABURIN\"  Blood Cultures: No results found for: \"BC\"  No results found for: \"BLOODCULT2\"  Organism: No results found for: \"ORG\"    Radiology results:  CT HEAD WO CONTRAST   Final Result   No acute intracranial abnormality. No evidence of intracranial hemorrhage. No evidence of focal abnormality or acute process in the brain. No fracture in calvarium or in base of skull. Evidence of acute, or subacute sinusitis with fluid levels in the bilateral   maxillary sinuses and the sphenoidal sinuses, most pronounced in the right   maxillary sinus. XR CHEST PORTABLE   Final Result   Mild left ventricular enlargement with no acute pulmonary abnormality.              Angelita Bella MD  11/21/23 4:31 AM

## 2023-11-21 NOTE — PROGRESS NOTES
11/21/23 1418   Encounter Summary   Encounter Overview/Reason  Initial Encounter   Service Provided For: Patient and family together   Referral/Consult From: [address] Family members   Last Encounter  11/21/23  ( visited and provided pastoral support and encouragement for family members)   Assessment/Intervention/Outcome   Assessment Unable to assess

## 2023-11-21 NOTE — CONSULTS
INPATIENT PULMONARY CRITICAL CARE CONSULT NOTE      Chief Complaint/Referring Provider:  Patient is being seen at the request of Dr. Payton Hernandez for a consultation for DKA     Presenting HPI: Patient ws brought to the hospital with altered mental status      Patient is a 37 y.o. female with pmhx of DM2, HTN, reactive airway disease (RAD) who presented to the ER on the evening of 11/20/23 for altered mental status. History retrieved from patient's  as well as EMR review. Patient began to experience URI symptoms approximately starting 11/11/23 including cough and possible fevers which led her to go to her  physician on 11/15/23 where she tested +ve for influenza A and as diagnosed with acute exacerbation of RAD secondary to flu and was started on oral azithromycin, decadron, promethazine-DM, duoneb and Tamiflu. Per patient's family at her bedside, she began taking these medications but by 11-18/19, she began to exhibit altered mental status with patient's friend noting she \"sounded drunk\" over the phone. Patient's  noted patient was \"not acting right\" and was unusually somnolent and lethargic which worsened over the course of 11/19-20, which culminated with him calling an ambulance after patient was found on the floor unresponsive late at night on 11/20. Of note, patient had visit with her PCP Dr. Pooja Wong MD on 11/6/23 where her Hgb a1c was found to be 11.7% and her antidiabetic medications consisted of Trulicity 1.5 mg subq F7Y and losartan 50 mg po qd.     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 No lower extremity edema. Pulmonary/Chest: No wheezes. No rales. Some chest congestion chest wall is not dull to percussion. No accessory muscle usage or stridor. Abdominal: Soft. Bowel sounds present. No distension or hernia. No tenderness. Musculoskeletal: No cyanosis. No clubbing. No obvious joint deformity. Lymphadenopathy: No cervical or supraclavicular adenopathy. Skin: Skin is warm and dry. No rash or nodules on the exposed extremities. Neurologic: Encephalopathic        Results:  CBC:   Recent Labs     11/21/23  0002 11/21/23 0431   WBC 20.3* 28.4*   HGB 18.1* 17.9*   HCT 58.9* 57.5*   MCV 96.7 96.1   * 397     BMP:   Recent Labs     11/21/23  0119 11/21/23  0431 11/21/23  0703    141  141 143   K 5.4* 4.2  4.2 4.1   CL 98* 104  105 108   CO2 6* 4*  3* 5*   PHOS 7.0* 4.2 2.6   BUN 45* 44*  44* 46*   CREATININE 1.4* 1.4*  1.4* 1.4*     LIVER PROFILE:   Recent Labs     11/21/23  0002 11/21/23  0431   AST 21 17   ALT 24 22   LIPASE 93.0*  --    BILIDIR <0.2  --    BILITOT 0.3 <0.2   ALKPHOS 170* 151*     PT/INR:   Recent Labs     11/21/23  0002 11/21/23  0703   PROTIME 16.5* 17.4*   INR 1.33* 1.42*     APTT:   Recent Labs     11/21/23  0002   APTT 21.1*     UA:  Recent Labs     11/21/23  0028   COLORU Yellow   PHUR 6.0   WBCUA 0-2   RBCUA 5-10*   BACTERIA Rare*   CLARITYU SL CLOUDY*   SPECGRAV >=1.030   LEUKOCYTESUR Negative   UROBILINOGEN 0.2   BILIRUBINUR SMALL*   BLOODU MODERATE*   GLUCOSEU >=1000*       Imaging:  I have reviewed radiology images personally. CT HEAD WO CONTRAST   Final Result   No acute intracranial abnormality. No evidence of intracranial hemorrhage. No evidence of focal abnormality or acute process in the brain. No fracture in calvarium or in base of skull. Evidence of acute, or subacute sinusitis with fluid levels in the bilateral   maxillary sinuses and the sphenoidal sinuses, most pronounced in the right   maxillary sinus. evaluations  Monitor input output and BMP  Correct electrolytes on whenever necessary basis  Monitor for any airway compromise which may require patient to be intubated and put on mechanical support  Patient also has a history of myotonic muscular dystrophy and history of AV block status post pacemaker in the past and into the complexity of the disease process  Trend the WBC count  Patient also has secondary polycythemia which may be secondary to intravascular depletion and needs to be reevaluated after the hydration  HST can be contemplated as an outpatient  PUD and DVT prophylaxis      Case discussed with patient's family and ICU team    Patient's clinical status is precarious and and critical and has potential for further decompensation and needs to monitor closely in the ICU    Critical care time spent with patient was 40 minutes exclusive of any procedures      As per patient's family patient carries a CODE STATUS of DNR CC arrest but patient's family will look into the living well and redirect the treatment team about that and in the meantime patient remains to be full code as per family            Electronically signed by:  Libia Clemons MD    11/21/2023    2:21 PM.

## 2023-11-21 NOTE — CONSULTS
PULMONARY AND CRITICAL CARE INPATIENT CONSULTATION      11/21/2023    Patient Name:  Khushi Humphries       1980       Evaluation was requested by Dr. Fuentes Ca regarding DKA. HPI: Patient is a 37 y.o. female with pmhx of DM2, HTN, reactive airway disease (RAD) who presented to the ER on the evening of 11/20/23 for altered mental status. History retrieved from patient's  as well as EMR review. Patient began to experience URI symptoms approximately starting 11/11/23 including cough and possible fevers which led her to go to her  physician on 11/15/23 where she tested +ve for influenza A and as diagnosed with acute exacerbation of RAD secondary to flu and was started on oral azithromycin, decadron, promethazine-DM, duoneb and Tamiflu. Per patient's family at her bedside, she began taking these medications but by 11-18/19, she began to exhibit altered mental status with patient's friend noting she \"sounded drunk\" over the phone. Patient's  noted patient was \"not acting right\" and was unusually somnolent and lethargic which worsened over the course of 11/19-20, which culminated with him calling an ambulance after patient was found on the floor unresponsive late at night on 11/20. Of note, patient had visit with her PCP Dr. Vi Booth MD on 11/6/23 where her Hgb a1c was found to be 11.7% and her antidiabetic medications consisted of Trulicity 1.5 mg subq V7C and losartan 50 mg po qd.      Invasive Lines: PICC D#1            Recent Labs     11/21/23  0036 11/21/23  1000   PHART 6.941* 7.229*   QSB3DTL 16.6* 21.1*   PO2ART 145.5* 127.9*       MV Settings:     / / [email or web address])    IV:  )   insulin 13.14 Units/hr (11/21/23 1054)    dextrose 5 % and 0.45 % NaCl      dextrose      sodium chloride 5 mL/hr at 11/21/23 0540    0.9% NaCl with KCl 20 mEq 250 mL/hr at 11/21/23 1115           ROS:   Per HPI     Patient Active Problem List    Diagnosis Date Noted    Diabetic ketoacidosis with coma associated

## 2023-11-22 PROBLEM — E87.0 HYPERNATREMIA: Status: ACTIVE | Noted: 2023-11-22

## 2023-11-22 LAB
ALBUMIN SERPL-MCNC: 2.7 G/DL (ref 3.4–5)
ALBUMIN SERPL-MCNC: 2.8 G/DL (ref 3.4–5)
ALBUMIN SERPL-MCNC: 2.9 G/DL (ref 3.4–5)
ALBUMIN SERPL-MCNC: 2.9 G/DL (ref 3.4–5)
ALBUMIN SERPL-MCNC: 3 G/DL (ref 3.4–5)
ALBUMIN/GLOB SERPL: 1.1 {RATIO} (ref 1.1–2.2)
ALP SERPL-CCNC: 98 U/L (ref 40–129)
ALT SERPL-CCNC: 18 U/L (ref 10–40)
ANION GAP SERPL CALCULATED.3IONS-SCNC: 12 MMOL/L (ref 3–16)
ANION GAP SERPL CALCULATED.3IONS-SCNC: 13 MMOL/L (ref 3–16)
ANION GAP SERPL CALCULATED.3IONS-SCNC: 14 MMOL/L (ref 3–16)
AST SERPL-CCNC: 18 U/L (ref 15–37)
BASOPHILS # BLD: 0 K/UL (ref 0–0.2)
BASOPHILS NFR BLD: 0.3 %
BILIRUB SERPL-MCNC: 0.4 MG/DL (ref 0–1)
BUN SERPL-MCNC: 11 MG/DL (ref 7–20)
BUN SERPL-MCNC: 14 MG/DL (ref 7–20)
BUN SERPL-MCNC: 18 MG/DL (ref 7–20)
BUN SERPL-MCNC: 21 MG/DL (ref 7–20)
BUN SERPL-MCNC: 28 MG/DL (ref 7–20)
CALCIUM SERPL-MCNC: 8.1 MG/DL (ref 8.3–10.6)
CALCIUM SERPL-MCNC: 8.2 MG/DL (ref 8.3–10.6)
CALCIUM SERPL-MCNC: 8.3 MG/DL (ref 8.3–10.6)
CHLORIDE SERPL-SCNC: 119 MMOL/L (ref 99–110)
CHLORIDE SERPL-SCNC: 120 MMOL/L (ref 99–110)
CHLORIDE SERPL-SCNC: 120 MMOL/L (ref 99–110)
CHLORIDE SERPL-SCNC: 121 MMOL/L (ref 99–110)
CHLORIDE SERPL-SCNC: 121 MMOL/L (ref 99–110)
CO2 SERPL-SCNC: 17 MMOL/L (ref 21–32)
CO2 SERPL-SCNC: 17 MMOL/L (ref 21–32)
CO2 SERPL-SCNC: 20 MMOL/L (ref 21–32)
CREAT SERPL-MCNC: 0.7 MG/DL (ref 0.6–1.1)
CREAT SERPL-MCNC: 0.8 MG/DL (ref 0.6–1.1)
CREAT SERPL-MCNC: 0.9 MG/DL (ref 0.6–1.1)
CREAT SERPL-MCNC: 1 MG/DL (ref 0.6–1.1)
CREAT SERPL-MCNC: 1 MG/DL (ref 0.6–1.1)
DEPRECATED RDW RBC AUTO: 13.8 % (ref 12.4–15.4)
EOSINOPHIL # BLD: 0 K/UL (ref 0–0.6)
EOSINOPHIL NFR BLD: 0 %
GFR SERPLBLD CREATININE-BSD FMLA CKD-EPI: >60 ML/MIN/{1.73_M2}
GLUCOSE BLD-MCNC: 143 MG/DL (ref 70–99)
GLUCOSE BLD-MCNC: 145 MG/DL (ref 70–99)
GLUCOSE BLD-MCNC: 157 MG/DL (ref 70–99)
GLUCOSE BLD-MCNC: 163 MG/DL (ref 70–99)
GLUCOSE BLD-MCNC: 165 MG/DL (ref 70–99)
GLUCOSE BLD-MCNC: 170 MG/DL (ref 70–99)
GLUCOSE BLD-MCNC: 172 MG/DL (ref 70–99)
GLUCOSE BLD-MCNC: 174 MG/DL (ref 70–99)
GLUCOSE BLD-MCNC: 174 MG/DL (ref 70–99)
GLUCOSE BLD-MCNC: 176 MG/DL (ref 70–99)
GLUCOSE BLD-MCNC: 180 MG/DL (ref 70–99)
GLUCOSE BLD-MCNC: 181 MG/DL (ref 70–99)
GLUCOSE BLD-MCNC: 181 MG/DL (ref 70–99)
GLUCOSE BLD-MCNC: 187 MG/DL (ref 70–99)
GLUCOSE BLD-MCNC: 196 MG/DL (ref 70–99)
GLUCOSE BLD-MCNC: 196 MG/DL (ref 70–99)
GLUCOSE BLD-MCNC: 197 MG/DL (ref 70–99)
GLUCOSE BLD-MCNC: 209 MG/DL (ref 70–99)
GLUCOSE BLD-MCNC: 219 MG/DL (ref 70–99)
GLUCOSE SERPL-MCNC: 194 MG/DL (ref 70–99)
GLUCOSE SERPL-MCNC: 200 MG/DL (ref 70–99)
GLUCOSE SERPL-MCNC: 215 MG/DL (ref 70–99)
GLUCOSE SERPL-MCNC: 217 MG/DL (ref 70–99)
GLUCOSE SERPL-MCNC: 218 MG/DL (ref 70–99)
HCT VFR BLD AUTO: 44.7 % (ref 36–48)
HGB BLD-MCNC: 15.3 G/DL (ref 12–16)
LYMPHOCYTES # BLD: 1 K/UL (ref 1–5.1)
LYMPHOCYTES NFR BLD: 8.1 %
MAGNESIUM SERPL-MCNC: 1.8 MG/DL (ref 1.8–2.4)
MAGNESIUM SERPL-MCNC: 1.9 MG/DL (ref 1.8–2.4)
MAGNESIUM SERPL-MCNC: 2 MG/DL (ref 1.8–2.4)
MAGNESIUM SERPL-MCNC: 2.1 MG/DL (ref 1.8–2.4)
MAGNESIUM SERPL-MCNC: 2.2 MG/DL (ref 1.8–2.4)
MCH RBC QN AUTO: 30.4 PG (ref 26–34)
MCHC RBC AUTO-ENTMCNC: 34.2 G/DL (ref 31–36)
MCV RBC AUTO: 88.9 FL (ref 80–100)
MONOCYTES # BLD: 0.8 K/UL (ref 0–1.3)
MONOCYTES NFR BLD: 7.2 %
NEUTROPHILS # BLD: 10 K/UL (ref 1.7–7.7)
NEUTROPHILS NFR BLD: 84.4 %
PERFORMED ON: ABNORMAL
PHOSPHATE SERPL-MCNC: 0.6 MG/DL (ref 2.5–4.9)
PHOSPHATE SERPL-MCNC: 0.8 MG/DL (ref 2.5–4.9)
PHOSPHATE SERPL-MCNC: 0.8 MG/DL (ref 2.5–4.9)
PHOSPHATE SERPL-MCNC: 1 MG/DL (ref 2.5–4.9)
PHOSPHATE SERPL-MCNC: 1.1 MG/DL (ref 2.5–4.9)
PHOSPHATE SERPL-MCNC: 1.1 MG/DL (ref 2.5–4.9)
PHOSPHATE SERPL-MCNC: 1.2 MG/DL (ref 2.5–4.9)
PHOSPHATE SERPL-MCNC: 1.5 MG/DL (ref 2.5–4.9)
PLATELET # BLD AUTO: 203 K/UL (ref 135–450)
PMV BLD AUTO: 7 FL (ref 5–10.5)
POTASSIUM SERPL-SCNC: 3 MMOL/L (ref 3.5–5.1)
POTASSIUM SERPL-SCNC: 3.1 MMOL/L (ref 3.5–5.1)
POTASSIUM SERPL-SCNC: 3.2 MMOL/L (ref 3.5–5.1)
POTASSIUM SERPL-SCNC: 3.3 MMOL/L (ref 3.5–5.1)
POTASSIUM SERPL-SCNC: 3.6 MMOL/L (ref 3.5–5.1)
PROT SERPL-MCNC: 5.6 G/DL (ref 6.4–8.2)
RBC # BLD AUTO: 5.03 M/UL (ref 4–5.2)
SODIUM SERPL-SCNC: 150 MMOL/L (ref 136–145)
SODIUM SERPL-SCNC: 151 MMOL/L (ref 136–145)
SODIUM SERPL-SCNC: 152 MMOL/L (ref 136–145)
SODIUM SERPL-SCNC: 152 MMOL/L (ref 136–145)
SODIUM SERPL-SCNC: 153 MMOL/L (ref 136–145)
WBC # BLD AUTO: 11.8 K/UL (ref 4–11)

## 2023-11-22 PROCEDURE — 85025 COMPLETE CBC W/AUTO DIFF WBC: CPT

## 2023-11-22 PROCEDURE — 6360000002 HC RX W HCPCS: Performed by: INTERNAL MEDICINE

## 2023-11-22 PROCEDURE — 2580000003 HC RX 258: Performed by: EMERGENCY MEDICINE

## 2023-11-22 PROCEDURE — 2580000003 HC RX 258: Performed by: HOSPITALIST

## 2023-11-22 PROCEDURE — 2500000003 HC RX 250 WO HCPCS: Performed by: HOSPITALIST

## 2023-11-22 PROCEDURE — 2000000000 HC ICU R&B

## 2023-11-22 PROCEDURE — 2500000003 HC RX 250 WO HCPCS: Performed by: INTERNAL MEDICINE

## 2023-11-22 PROCEDURE — C9113 INJ PANTOPRAZOLE SODIUM, VIA: HCPCS | Performed by: INTERNAL MEDICINE

## 2023-11-22 PROCEDURE — 6360000002 HC RX W HCPCS: Performed by: EMERGENCY MEDICINE

## 2023-11-22 PROCEDURE — 6370000000 HC RX 637 (ALT 250 FOR IP): Performed by: HOSPITALIST

## 2023-11-22 PROCEDURE — 6360000002 HC RX W HCPCS: Performed by: STUDENT IN AN ORGANIZED HEALTH CARE EDUCATION/TRAINING PROGRAM

## 2023-11-22 PROCEDURE — 2500000003 HC RX 250 WO HCPCS: Performed by: STUDENT IN AN ORGANIZED HEALTH CARE EDUCATION/TRAINING PROGRAM

## 2023-11-22 PROCEDURE — 6370000000 HC RX 637 (ALT 250 FOR IP): Performed by: EMERGENCY MEDICINE

## 2023-11-22 PROCEDURE — 84100 ASSAY OF PHOSPHORUS: CPT

## 2023-11-22 PROCEDURE — 2580000003 HC RX 258: Performed by: STUDENT IN AN ORGANIZED HEALTH CARE EDUCATION/TRAINING PROGRAM

## 2023-11-22 PROCEDURE — 80053 COMPREHEN METABOLIC PANEL: CPT

## 2023-11-22 PROCEDURE — 99291 CRITICAL CARE FIRST HOUR: CPT | Performed by: INTERNAL MEDICINE

## 2023-11-22 PROCEDURE — 83735 ASSAY OF MAGNESIUM: CPT

## 2023-11-22 RX ORDER — INSULIN GLARGINE 100 [IU]/ML
0.25 INJECTION, SOLUTION SUBCUTANEOUS NIGHTLY
Status: DISCONTINUED | OUTPATIENT
Start: 2023-11-22 | End: 2023-11-25 | Stop reason: HOSPADM

## 2023-11-22 RX ORDER — DEXTROSE MONOHYDRATE 100 MG/ML
INJECTION, SOLUTION INTRAVENOUS CONTINUOUS PRN
Status: DISCONTINUED | OUTPATIENT
Start: 2023-11-22 | End: 2023-11-25 | Stop reason: HOSPADM

## 2023-11-22 RX ORDER — INSULIN LISPRO 100 [IU]/ML
0.08 INJECTION, SOLUTION INTRAVENOUS; SUBCUTANEOUS
Status: DISCONTINUED | OUTPATIENT
Start: 2023-11-22 | End: 2023-11-25 | Stop reason: HOSPADM

## 2023-11-22 RX ORDER — INSULIN LISPRO 100 [IU]/ML
0-8 INJECTION, SOLUTION INTRAVENOUS; SUBCUTANEOUS
Status: DISCONTINUED | OUTPATIENT
Start: 2023-11-22 | End: 2023-11-25

## 2023-11-22 RX ORDER — INSULIN LISPRO 100 [IU]/ML
0-4 INJECTION, SOLUTION INTRAVENOUS; SUBCUTANEOUS NIGHTLY
Status: DISCONTINUED | OUTPATIENT
Start: 2023-11-22 | End: 2023-11-25

## 2023-11-22 RX ORDER — DEXTROSE MONOHYDRATE, SODIUM CHLORIDE, AND POTASSIUM CHLORIDE 50; 1.49; 4.5 G/1000ML; G/1000ML; G/1000ML
INJECTION, SOLUTION INTRAVENOUS CONTINUOUS
Status: DISCONTINUED | OUTPATIENT
Start: 2023-11-22 | End: 2023-11-22

## 2023-11-22 RX ADMIN — POTASSIUM PHOSPHATE, MONOBASIC POTASSIUM PHOSPHATE, DIBASIC 30 MMOL: 224; 236 INJECTION, SOLUTION, CONCENTRATE INTRAVENOUS at 20:40

## 2023-11-22 RX ADMIN — POTASSIUM CHLORIDE, DEXTROSE MONOHYDRATE AND SODIUM CHLORIDE: 150; 5; 450 INJECTION, SOLUTION INTRAVENOUS at 17:41

## 2023-11-22 RX ADMIN — SODIUM CHLORIDE 6.76 UNITS/HR: 9 INJECTION, SOLUTION INTRAVENOUS at 08:01

## 2023-11-22 RX ADMIN — SODIUM CHLORIDE 7.65 UNITS/HR: 9 INJECTION, SOLUTION INTRAVENOUS at 10:16

## 2023-11-22 RX ADMIN — POTASSIUM CHLORIDE 10 MEQ: 7.46 INJECTION, SOLUTION INTRAVENOUS at 02:51

## 2023-11-22 RX ADMIN — POTASSIUM PHOSPHATE, MONOBASIC AND POTASSIUM PHOSPHATE, DIBASIC 20 MMOL: 224; 236 INJECTION, SOLUTION, CONCENTRATE INTRAVENOUS at 06:49

## 2023-11-22 RX ADMIN — Medication 10 ML: at 09:29

## 2023-11-22 RX ADMIN — SODIUM CHLORIDE 9.82 UNITS/HR: 9 INJECTION, SOLUTION INTRAVENOUS at 12:06

## 2023-11-22 RX ADMIN — POTASSIUM CHLORIDE, DEXTROSE MONOHYDRATE AND SODIUM CHLORIDE: 150; 5; 450 INJECTION, SOLUTION INTRAVENOUS at 09:37

## 2023-11-22 RX ADMIN — POTASSIUM CHLORIDE 10 MEQ: 7.46 INJECTION, SOLUTION INTRAVENOUS at 23:57

## 2023-11-22 RX ADMIN — SODIUM CHLORIDE 6.11 UNITS/HR: 9 INJECTION, SOLUTION INTRAVENOUS at 15:27

## 2023-11-22 RX ADMIN — MUPIROCIN: 20 OINTMENT TOPICAL at 20:39

## 2023-11-22 RX ADMIN — ENOXAPARIN SODIUM 40 MG: 100 INJECTION SUBCUTANEOUS at 09:30

## 2023-11-22 RX ADMIN — AMPICILLIN SODIUM AND SULBACTAM SODIUM 3000 MG: 2; 1 INJECTION, POWDER, FOR SOLUTION INTRAMUSCULAR; INTRAVENOUS at 17:41

## 2023-11-22 RX ADMIN — AMPICILLIN SODIUM AND SULBACTAM SODIUM 3000 MG: 2; 1 INJECTION, POWDER, FOR SOLUTION INTRAMUSCULAR; INTRAVENOUS at 05:03

## 2023-11-22 RX ADMIN — SODIUM CHLORIDE 8.37 UNITS/HR: 9 INJECTION, SOLUTION INTRAVENOUS at 13:14

## 2023-11-22 RX ADMIN — POTASSIUM CHLORIDE 10 MEQ: 7.46 INJECTION, SOLUTION INTRAVENOUS at 22:55

## 2023-11-22 RX ADMIN — POTASSIUM CHLORIDE 10 MEQ: 7.46 INJECTION, SOLUTION INTRAVENOUS at 01:45

## 2023-11-22 RX ADMIN — MUPIROCIN: 20 OINTMENT TOPICAL at 09:29

## 2023-11-22 RX ADMIN — PANTOPRAZOLE SODIUM 40 MG: 40 INJECTION, POWDER, FOR SOLUTION INTRAVENOUS at 09:29

## 2023-11-22 RX ADMIN — SODIUM CHLORIDE 8.88 UNITS/HR: 9 INJECTION, SOLUTION INTRAVENOUS at 03:57

## 2023-11-22 RX ADMIN — AMPICILLIN SODIUM AND SULBACTAM SODIUM 3000 MG: 2; 1 INJECTION, POWDER, FOR SOLUTION INTRAMUSCULAR; INTRAVENOUS at 00:52

## 2023-11-22 RX ADMIN — POTASSIUM CHLORIDE 10 MEQ: 7.46 INJECTION, SOLUTION INTRAVENOUS at 22:00

## 2023-11-22 RX ADMIN — POTASSIUM CHLORIDE 10 MEQ: 7.46 INJECTION, SOLUTION INTRAVENOUS at 20:42

## 2023-11-22 RX ADMIN — SODIUM CHLORIDE 6.37 UNITS/HR: 9 INJECTION, SOLUTION INTRAVENOUS at 11:01

## 2023-11-22 RX ADMIN — Medication 10 ML: at 20:39

## 2023-11-22 RX ADMIN — INSULIN GLARGINE 25 UNITS: 100 INJECTION, SOLUTION SUBCUTANEOUS at 20:38

## 2023-11-22 RX ADMIN — SODIUM CHLORIDE 4.48 UNITS/HR: 9 INJECTION, SOLUTION INTRAVENOUS at 14:14

## 2023-11-22 RX ADMIN — AMPICILLIN SODIUM AND SULBACTAM SODIUM 3000 MG: 2; 1 INJECTION, POWDER, FOR SOLUTION INTRAMUSCULAR; INTRAVENOUS at 13:16

## 2023-11-22 RX ADMIN — POTASSIUM CHLORIDE 10 MEQ: 7.46 INJECTION, SOLUTION INTRAVENOUS at 03:53

## 2023-11-22 ASSESSMENT — PAIN SCALES - GENERAL
PAINLEVEL_OUTOF10: 0
PAINLEVEL_OUTOF10: 3

## 2023-11-22 ASSESSMENT — PAIN SCALES - WONG BAKER

## 2023-11-22 NOTE — CARE COORDINATION
LOS 1-  Pt is DKA w/  encephalopathy- slt altered mental status- sister is at bedside. Informed us the pt's spouse just lost his father couple days ago.  They have 2 sons 13 & 23-  insulin gtt- unable to do MRi today due to pt mental status having a pacemaker- Cont to follow for DC needs when medically ready

## 2023-11-22 NOTE — PROGRESS NOTES
Per policy pt is not a candidate for MRI. Patients with pacemakers MUST be able to communicate with technologist during the MRI to inform us if any complications occur. Per RN, patient is unable to communicate, only moans and  groans. Confirmed with Dr. Bello Speaks that per policy, she is not an MRI candidate.

## 2023-11-22 NOTE — PROGRESS NOTES
Hospital Medicine Progress Note      Date of Admission: 11/20/2023  Hospital Day: 3    Chief Admission Complaint:  AMS     Subjective:  remains poorly responsive, friend at bedside, on insulin ggt    Presenting Admission History:         Patient is a 37 y.o. female with a PMHx of T2DM who presented to the ED with AMS. Per the  the patient was recently diagnosed with the Flu and was treated with steroids, azithro and tamiflu. He reports 2 days prior to admission she became more lethargic and fatigued. The  reports she is a W3WM and is on trulicity weekly. He reports her symptoms worsened the day of arrival as she was starring off and was unresponsive. History obtained from: Patient, ED provider, EMR and family     Assessment/Plan:      Current Principal Problem:  Diabetic ketoacidosis with coma associated with type 2 diabetes mellitus ([address])    DKA with coma 2/2 T2DM  Hyperkalemia  Sinusitis   - Presents due to AMS, recently treated for Flu with tamiflu and steroids   -On arrival, hypothermic, labs suggestive of DKA with elevated BS, AGMA and pH of 6.9 and hyperkalemia   -CXR, UA and lipase unremarkable. CTH shows evidence of sinusitis, however suspect leukocytosis to be reactive and 2/2 to recent steroids use. Likely etiology/trigger is recent Flu infection precipitated by steroid use and sinusitis.  Will empirically treat for sinusitis as patient unable to provide history    -started DKA protocol  -BMP q4h  -POCT q1h  -Neuro checks   -Unasyn for sinusitis pending clinical improvement      Acute encephalopathy- initially thought to be 2/2 above  -pulm/cc consulted, apprec assistance in mgmt  -MRI ordered but not able to do as pt needs to communicate given pacer hx    DEREK  -Cr 1.5 on admit (baseline ~0.6)  -Likely pre-renal 2/2 decreased PO intake, ARB and osmotic diuresis                  -IVF              -Avoid nephrotoxin              -Monitor I/O              -Bladder scan     Myotonic

## 2023-11-22 NOTE — PROGRESS NOTES
Patient had order for MRI. Questionnaire was completed. Radiologist informed me since patient had pacemaker and was obtunded , she could not have MRI. Dr. Bradley Driver was notified.

## 2023-11-22 NOTE — PLAN OF CARE
Problem: Discharge Planning  Goal: Discharge to home or other facility with appropriate resources  Recent Flowsheet Documentation  Taken 11/22/2023 0800 by Hamilton Tavarez RN  Discharge to home or other facility with appropriate resources:   Identify barriers to discharge with patient and caregiver   Arrange for needed discharge resources and transportation as appropriate     Problem: Pain  Goal: Verbalizes/displays adequate comfort level or baseline comfort level  Recent Flowsheet Documentation  Taken 11/22/2023 0800 by Hamilton Tavarez RN  Verbalizes/displays adequate comfort level or baseline comfort level: Assess pain using appropriate pain scale     Problem: Chronic Conditions and Co-morbidities  Goal: Patient's chronic conditions and co-morbidity symptoms are monitored and maintained or improved  Recent Flowsheet Documentation  Taken 11/22/2023 0800 by Hamilton Tavarez RN  Care Plan - Patient's Chronic Conditions and Co-Morbidity Symptoms are Monitored and Maintained or Improved:   Monitor and assess patient's chronic conditions and comorbid symptoms for stability, deterioration, or improvement   Collaborate with multidisciplinary team to address chronic and comorbid conditions and prevent exacerbation or deterioration

## 2023-11-23 LAB
ALBUMIN SERPL-MCNC: 2.7 G/DL (ref 3.4–5)
ALBUMIN/GLOB SERPL: 1.1 {RATIO} (ref 1.1–2.2)
ALP SERPL-CCNC: 91 U/L (ref 40–129)
ALT SERPL-CCNC: 17 U/L (ref 10–40)
ANION GAP SERPL CALCULATED.3IONS-SCNC: 12 MMOL/L (ref 3–16)
AST SERPL-CCNC: 21 U/L (ref 15–37)
BASOPHILS # BLD: 0.1 K/UL (ref 0–0.2)
BASOPHILS NFR BLD: 0.8 %
BILIRUB SERPL-MCNC: 0.7 MG/DL (ref 0–1)
BUN SERPL-MCNC: 9 MG/DL (ref 7–20)
CALCIUM SERPL-MCNC: 8.1 MG/DL (ref 8.3–10.6)
CHLORIDE SERPL-SCNC: 115 MMOL/L (ref 99–110)
CO2 SERPL-SCNC: 22 MMOL/L (ref 21–32)
CREAT SERPL-MCNC: 0.6 MG/DL (ref 0.6–1.1)
DEPRECATED RDW RBC AUTO: 13.6 % (ref 12.4–15.4)
EOSINOPHIL # BLD: 0 K/UL (ref 0–0.6)
EOSINOPHIL NFR BLD: 0.1 %
GFR SERPLBLD CREATININE-BSD FMLA CKD-EPI: >60 ML/MIN/{1.73_M2}
GLUCOSE BLD-MCNC: 189 MG/DL (ref 70–99)
GLUCOSE BLD-MCNC: 220 MG/DL (ref 70–99)
GLUCOSE BLD-MCNC: 233 MG/DL (ref 70–99)
GLUCOSE BLD-MCNC: 244 MG/DL (ref 70–99)
GLUCOSE SERPL-MCNC: 224 MG/DL (ref 70–99)
HCT VFR BLD AUTO: 41 % (ref 36–48)
HGB BLD-MCNC: 14 G/DL (ref 12–16)
INR PPP: 1.27 (ref 0.84–1.16)
LYMPHOCYTES # BLD: 2 K/UL (ref 1–5.1)
LYMPHOCYTES NFR BLD: 24.1 %
MCH RBC QN AUTO: 30.2 PG (ref 26–34)
MCHC RBC AUTO-ENTMCNC: 34.2 G/DL (ref 31–36)
MCV RBC AUTO: 88.1 FL (ref 80–100)
MONOCYTES # BLD: 0.7 K/UL (ref 0–1.3)
MONOCYTES NFR BLD: 7.8 %
NEUTROPHILS # BLD: 5.7 K/UL (ref 1.7–7.7)
NEUTROPHILS NFR BLD: 67.2 %
PERFORMED ON: ABNORMAL
PLATELET # BLD AUTO: 171 K/UL (ref 135–450)
PMV BLD AUTO: 7.6 FL (ref 5–10.5)
POTASSIUM SERPL-SCNC: 3.8 MMOL/L (ref 3.5–5.1)
PROT SERPL-MCNC: 5.2 G/DL (ref 6.4–8.2)
PROTHROMBIN TIME: 15.9 SEC (ref 11.5–14.8)
RBC # BLD AUTO: 4.65 M/UL (ref 4–5.2)
SODIUM SERPL-SCNC: 149 MMOL/L (ref 136–145)
WBC # BLD AUTO: 8.4 K/UL (ref 4–11)

## 2023-11-23 PROCEDURE — 99291 CRITICAL CARE FIRST HOUR: CPT | Performed by: INTERNAL MEDICINE

## 2023-11-23 PROCEDURE — 92610 EVALUATE SWALLOWING FUNCTION: CPT

## 2023-11-23 PROCEDURE — 2000000000 HC ICU R&B

## 2023-11-23 PROCEDURE — 2580000003 HC RX 258: Performed by: STUDENT IN AN ORGANIZED HEALTH CARE EDUCATION/TRAINING PROGRAM

## 2023-11-23 PROCEDURE — 85025 COMPLETE CBC W/AUTO DIFF WBC: CPT

## 2023-11-23 PROCEDURE — 6360000002 HC RX W HCPCS: Performed by: INTERNAL MEDICINE

## 2023-11-23 PROCEDURE — 1200000000 HC SEMI PRIVATE

## 2023-11-23 PROCEDURE — 92526 ORAL FUNCTION THERAPY: CPT

## 2023-11-23 PROCEDURE — 6360000002 HC RX W HCPCS: Performed by: STUDENT IN AN ORGANIZED HEALTH CARE EDUCATION/TRAINING PROGRAM

## 2023-11-23 PROCEDURE — 85610 PROTHROMBIN TIME: CPT

## 2023-11-23 PROCEDURE — 6370000000 HC RX 637 (ALT 250 FOR IP): Performed by: HOSPITALIST

## 2023-11-23 PROCEDURE — 80053 COMPREHEN METABOLIC PANEL: CPT

## 2023-11-23 PROCEDURE — C9113 INJ PANTOPRAZOLE SODIUM, VIA: HCPCS | Performed by: INTERNAL MEDICINE

## 2023-11-23 PROCEDURE — 6370000000 HC RX 637 (ALT 250 FOR IP): Performed by: STUDENT IN AN ORGANIZED HEALTH CARE EDUCATION/TRAINING PROGRAM

## 2023-11-23 RX ORDER — ONDANSETRON 2 MG/ML
4 INJECTION INTRAMUSCULAR; INTRAVENOUS EVERY 6 HOURS PRN
Status: DISCONTINUED | OUTPATIENT
Start: 2023-11-23 | End: 2023-11-25 | Stop reason: HOSPADM

## 2023-11-23 RX ADMIN — MUPIROCIN: 20 OINTMENT TOPICAL at 20:22

## 2023-11-23 RX ADMIN — ACETAMINOPHEN 650 MG: 325 TABLET ORAL at 20:22

## 2023-11-23 RX ADMIN — INSULIN LISPRO 2 UNITS: 100 INJECTION, SOLUTION INTRAVENOUS; SUBCUTANEOUS at 11:51

## 2023-11-23 RX ADMIN — INSULIN LISPRO 2 UNITS: 100 INJECTION, SOLUTION INTRAVENOUS; SUBCUTANEOUS at 17:07

## 2023-11-23 RX ADMIN — MUPIROCIN: 20 OINTMENT TOPICAL at 11:53

## 2023-11-23 RX ADMIN — AMPICILLIN SODIUM AND SULBACTAM SODIUM 3000 MG: 2; 1 INJECTION, POWDER, FOR SOLUTION INTRAMUSCULAR; INTRAVENOUS at 00:01

## 2023-11-23 RX ADMIN — ONDANSETRON 4 MG: 2 INJECTION INTRAMUSCULAR; INTRAVENOUS at 12:18

## 2023-11-23 RX ADMIN — Medication 10 ML: at 11:55

## 2023-11-23 RX ADMIN — Medication 10 ML: at 20:22

## 2023-11-23 RX ADMIN — AMPICILLIN SODIUM AND SULBACTAM SODIUM 3000 MG: 2; 1 INJECTION, POWDER, FOR SOLUTION INTRAMUSCULAR; INTRAVENOUS at 11:36

## 2023-11-23 RX ADMIN — INSULIN GLARGINE 25 UNITS: 100 INJECTION, SOLUTION SUBCUTANEOUS at 20:21

## 2023-11-23 RX ADMIN — PANTOPRAZOLE SODIUM 40 MG: 40 INJECTION, POWDER, FOR SOLUTION INTRAVENOUS at 11:52

## 2023-11-23 RX ADMIN — AMPICILLIN SODIUM AND SULBACTAM SODIUM 3000 MG: 2; 1 INJECTION, POWDER, FOR SOLUTION INTRAMUSCULAR; INTRAVENOUS at 17:06

## 2023-11-23 RX ADMIN — AMPICILLIN SODIUM AND SULBACTAM SODIUM 3000 MG: 2; 1 INJECTION, POWDER, FOR SOLUTION INTRAMUSCULAR; INTRAVENOUS at 06:16

## 2023-11-23 ASSESSMENT — PAIN SCALES - WONG BAKER
WONGBAKER_NUMERICALRESPONSE: 0

## 2023-11-23 NOTE — PROGRESS NOTES
Hospital Medicine Progress Note      Date of Admission: 11/20/2023  Hospital Day: 4      Chief Admission Complaint:  AMS     Subjective:  remains more responsive this morning, friend at bedside, off insulin ggt, on ivfs, TFs, potassium iv     Presenting Admission History:          Patient is a 37 y.o. female with a PMHx of T2DM who presented to the ED with AMS. Per the  the patient was recently diagnosed with the Flu and was treated with steroids, azithro and tamiflu. He reports 2 days prior to admission she became more lethargic and fatigued. The  reports she is a O9QE and is on trulicity weekly. He reports her symptoms worsened the day of arrival as she was starring off and was unresponsive. History obtained from: Patient, ED provider, EMR and family     Assessment/Plan:      Current Principal Problem:  Diabetic ketoacidosis with coma associated with type 2 diabetes mellitus ([facility])      DKA with coma 2/2 T2DM  Hyperkalemia  Sinusitis   - Presents due to AMS, recently treated for Flu with tamiflu and steroids   -On arrival, hypothermic, labs suggestive of DKA with elevated BS, AGMA and pH of 6.9 and hyperkalemia   -CXR, UA and lipase unremarkable. CTH shows evidence of sinusitis, however suspect leukocytosis to be reactive and 2/2 to recent steroids use. Likely etiology/trigger is recent Flu infection precipitated by steroid use and sinusitis.  Will empirically treat for sinusitis as patient unable to provide history    -started DKA protocol  -BMP q4h  -POCT q1h  -Neuro checks   -Unasyn for sinusitis pending clinical improvement      Acute encephalopathy- initially thought to be 2/2 above  -pulm/cc consulted, apprec assistance in mgmt  -MRI ordered but not able to do initially as pt needs to communicate given pacer hx   -neuro consulted, apprec recs,     DEREK  -Cr 1.5 on admit (baseline ~0.6)  -Likely pre-renal 2/2 decreased PO intake, ARB and osmotic diuresis    -IVFs given    -Avoid

## 2023-11-23 NOTE — CONSULTS
In patient Neurology consult        [facility] Neurology      MD Jazmyn Vazquezbridgett Callowayshaun  1980    Date of Service: 11/23/2023    Referring Physician: Tram Vargas MD    Most of the history was obtained from detailed chart reviewing and discussion with the patient's family. The patient is currently confused and unable to provide me with accurate history. Reason for the consult and CC: Acute encephalopathy    HPI:   The patient is a 37y.o.  years old female with history of diabetes and other medical problems who was admitted to the hospital recently with acute confusion. Patient was diagnosed recently with the flu and sinusitis few days prior to admission. She received some steroids which cause increased her blood sugar. She became abruptly confused at home for the last 2 to 3 days and was not following direction . Degree was severe and persistent. No witnessed seizure, falling or injury or focal weakness. No recent travel or bladder or bowel issues. She came to the ED where she was found to be in DKA. She was admitted to the ICU. Over the last 24 hours, she has been waxing and waning and neurology was consulted. Now she is awake and alert. She can follow direction but not consistently. According to family, she was better than 2 to 3 days ago. White count today is normal.  She denies any headache or neck pain. Has a pacemaker and so far not able to do MRI. Other review of system is limited. I personally reviewed and updated social history, past medical history, medications, allergy, surgical history, and family history as documented in the patient's electronic health records. ROS: 10-14 system review, reviewed with patient's family and/or nurse was unremarkable except mentioned in HPI.      Constitutional:   Vitals:    11/23/23 0300 11/23/23 0400 11/23/23 0500 11/23/23 0600   BP: (!) 160/104 (!) 153/104 (!) 162/116 (!) 159/127   Pulse: 98 95 87 83   Resp: 20 21 22 23 antibiotics  Follow electrolytes  Neurochecks  PT and OT  Speech  Respiratory support  Replace electrolytes and follow sodium level  Follow TFT  DVT and GI prophylaxis   Aspiration precautions  Insulin sliding scale  Diabetic control  Blood sugar monitor  Discussed with family  We will follow      Thank you for referring such patient. If you have any questions regarding my consult note, please don't hesitate to call me. Maura Goodwin MD  [phone]    This dictation was generated by voice recognition computer software.  Although all attempts are made to edit the dictation for accuracy, there may be errors in the  transcription that are not intended

## 2023-11-24 LAB
GLUCOSE BLD-MCNC: 192 MG/DL (ref 70–99)
GLUCOSE BLD-MCNC: 257 MG/DL (ref 70–99)
GLUCOSE BLD-MCNC: 292 MG/DL (ref 70–99)
GLUCOSE BLD-MCNC: 341 MG/DL (ref 70–99)
PERFORMED ON: ABNORMAL

## 2023-11-24 PROCEDURE — 97166 OT EVAL MOD COMPLEX 45 MIN: CPT

## 2023-11-24 PROCEDURE — 6360000002 HC RX W HCPCS: Performed by: INTERNAL MEDICINE

## 2023-11-24 PROCEDURE — 92526 ORAL FUNCTION THERAPY: CPT

## 2023-11-24 PROCEDURE — 97116 GAIT TRAINING THERAPY: CPT

## 2023-11-24 PROCEDURE — 97162 PT EVAL MOD COMPLEX 30 MIN: CPT

## 2023-11-24 PROCEDURE — 95816 EEG AWAKE AND DROWSY: CPT

## 2023-11-24 PROCEDURE — 99232 SBSQ HOSP IP/OBS MODERATE 35: CPT | Performed by: INTERNAL MEDICINE

## 2023-11-24 PROCEDURE — 97530 THERAPEUTIC ACTIVITIES: CPT

## 2023-11-24 PROCEDURE — 97535 SELF CARE MNGMENT TRAINING: CPT

## 2023-11-24 PROCEDURE — 2580000003 HC RX 258: Performed by: STUDENT IN AN ORGANIZED HEALTH CARE EDUCATION/TRAINING PROGRAM

## 2023-11-24 PROCEDURE — 1200000000 HC SEMI PRIVATE

## 2023-11-24 PROCEDURE — 6370000000 HC RX 637 (ALT 250 FOR IP): Performed by: HOSPITALIST

## 2023-11-24 PROCEDURE — 92523 SPEECH SOUND LANG COMPREHEN: CPT

## 2023-11-24 PROCEDURE — 6360000002 HC RX W HCPCS: Performed by: STUDENT IN AN ORGANIZED HEALTH CARE EDUCATION/TRAINING PROGRAM

## 2023-11-24 PROCEDURE — 6370000000 HC RX 637 (ALT 250 FOR IP): Performed by: STUDENT IN AN ORGANIZED HEALTH CARE EDUCATION/TRAINING PROGRAM

## 2023-11-24 PROCEDURE — C9113 INJ PANTOPRAZOLE SODIUM, VIA: HCPCS | Performed by: INTERNAL MEDICINE

## 2023-11-24 RX ORDER — CALCIUM CARBONATE 500 MG/1
500 TABLET, CHEWABLE ORAL 3 TIMES DAILY PRN
Status: DISCONTINUED | OUTPATIENT
Start: 2023-11-24 | End: 2023-11-25 | Stop reason: HOSPADM

## 2023-11-24 RX ADMIN — AMPICILLIN SODIUM AND SULBACTAM SODIUM 3000 MG: 2; 1 INJECTION, POWDER, FOR SOLUTION INTRAMUSCULAR; INTRAVENOUS at 06:56

## 2023-11-24 RX ADMIN — INSULIN LISPRO 6 UNITS: 100 INJECTION, SOLUTION INTRAVENOUS; SUBCUTANEOUS at 16:47

## 2023-11-24 RX ADMIN — AMPICILLIN SODIUM AND SULBACTAM SODIUM 3000 MG: 2; 1 INJECTION, POWDER, FOR SOLUTION INTRAMUSCULAR; INTRAVENOUS at 01:00

## 2023-11-24 RX ADMIN — AMPICILLIN SODIUM AND SULBACTAM SODIUM 3000 MG: 2; 1 INJECTION, POWDER, FOR SOLUTION INTRAMUSCULAR; INTRAVENOUS at 11:26

## 2023-11-24 RX ADMIN — INSULIN LISPRO 6 UNITS: 100 INJECTION, SOLUTION INTRAVENOUS; SUBCUTANEOUS at 11:44

## 2023-11-24 RX ADMIN — Medication 10 ML: at 08:47

## 2023-11-24 RX ADMIN — INSULIN GLARGINE 25 UNITS: 100 INJECTION, SOLUTION SUBCUTANEOUS at 21:07

## 2023-11-24 RX ADMIN — INSULIN LISPRO 8 UNITS: 100 INJECTION, SOLUTION INTRAVENOUS; SUBCUTANEOUS at 08:46

## 2023-11-24 RX ADMIN — PANTOPRAZOLE SODIUM 40 MG: 40 INJECTION, POWDER, FOR SOLUTION INTRAVENOUS at 08:47

## 2023-11-24 RX ADMIN — INSULIN LISPRO 8 UNITS: 100 INJECTION, SOLUTION INTRAVENOUS; SUBCUTANEOUS at 16:48

## 2023-11-24 RX ADMIN — INSULIN LISPRO 8 UNITS: 100 INJECTION, SOLUTION INTRAVENOUS; SUBCUTANEOUS at 11:44

## 2023-11-24 RX ADMIN — ACETAMINOPHEN 650 MG: 325 TABLET ORAL at 09:57

## 2023-11-24 RX ADMIN — Medication 10 ML: at 21:07

## 2023-11-24 RX ADMIN — ACETAMINOPHEN 650 MG: 325 TABLET ORAL at 17:52

## 2023-11-24 RX ADMIN — AMPICILLIN SODIUM AND SULBACTAM SODIUM 3000 MG: 2; 1 INJECTION, POWDER, FOR SOLUTION INTRAMUSCULAR; INTRAVENOUS at 17:48

## 2023-11-24 ASSESSMENT — PAIN SCALES - WONG BAKER
WONGBAKER_NUMERICALRESPONSE: 0

## 2023-11-24 ASSESSMENT — PAIN SCALES - GENERAL
PAINLEVEL_OUTOF10: 7
PAINLEVEL_OUTOF10: 7
PAINLEVEL_OUTOF10: 0
PAINLEVEL_OUTOF10: 0

## 2023-11-24 ASSESSMENT — PAIN - FUNCTIONAL ASSESSMENT: PAIN_FUNCTIONAL_ASSESSMENT: ACTIVITIES ARE NOT PREVENTED

## 2023-11-24 ASSESSMENT — PAIN DESCRIPTION - DESCRIPTORS
DESCRIPTORS: ACHING
DESCRIPTORS: POUNDING;ACHING

## 2023-11-24 ASSESSMENT — PAIN DESCRIPTION - LOCATION
LOCATION: HEAD
LOCATION: HEAD

## 2023-11-24 NOTE — PROGRESS NOTES
Speech Language Pathology  Facility/Department: [facility] C3 TELE/MED SURG/ONC  Initial Speech/Language/Cognitive Assessment       NAME:Priya Foster  : 1980 (37 y.o.)   MRN: [id number]  ROOM: [id number]/0336-01  ADMISSION DATE: 2023  PATIENT DIAGNOSIS(ES): Altered mental status, unspecified altered mental status type [R41.82]  Diabetic ketoacidosis with coma associated with type 2 diabetes mellitus ([address]) [E11.11]  Diabetic ketoacidosis with coma associated with other specified diabetes mellitus ([address]) [E13.11]  Patient Active Problem List    Diagnosis Date Noted    Hypernatremia 2023    Diabetic ketoacidosis with coma associated with type 2 diabetes mellitus ([address]) 2023    Acute non-recurrent maxillary sinusitis 2023    Bandemia 2023    DEREK (acute kidney injury) ([address]) 2023    Class 1 obesity in adult 2023    Acute encephalopathy 2023    Secondary polycythemia 2023    Diabetic acidosis ([address]) 2023    Gallstones     Mechanical complication of hip prosthesis ([address]) 2012    Right THRevision of acetabulum 2012    Myotonic muscular dystrophy ([address]) 10/19/2011    Pacemaker 10/19/2011    Tonsillar hypertrophy 2010    Seasonal allergies 2010     Past Medical History:   Diagnosis Date    DEREK (acute kidney injury) ([address]) 2023    Allergic rhinitis     Alopecia     Asthma     Fatigue     GERD (gastroesophageal reflux disease)     Mechanical complication of hip prosthesis ([address]) 2012    Morbid obesity ([address])     Myotonic muscular dystrophy ([address])      Past Surgical History:   Procedure Laterality Date    CARDIAC SURGERY      pacemaker     SECTION  x 2    CHOLECYSTECTOMY, LAPAROSCOPIC N/A 2018    JOINT REPLACEMENT      hip relacements bilateral    JOINT REPLACEMENT  12    right hip    NASAL SEPTUM SURGERY      PACEMAKER PLACEMENT  2004     Allergies   Allergen Reactions    Erythromycin Nausea And Vomiting and Nausea Only Metformin Nausea And Vomiting       Date of Evaluation: 11/24/2023   Evaluating Therapist: KYLE Galvan    Subjective:   Chart Reviewed: : [x] Yes [] No    Onset Date: admitted to [facility] 11/21/23, orders placed 11/23/23    Recent Results of CT of Head / MRI:  Date: 11/21/23  Impressions:   No acute intracranial abnormality. No evidence of intracranial hemorrhage. No evidence of focal abnormality or acute process in the brain. No fracture in calvarium or in base of skull. Evidence of acute, or subacute sinusitis with fluid levels in the bilateral  maxillary sinuses and the sphenoidal sinuses, most pronounced in the right  maxillary sinus. Medical record review/interview: Per MD H&P on 11/21/23: \"Patient is a 37 y.o. female with a PMHx of T2DM who presented to the ED with AMS. Per the  the patient was recently diagnosed with the Flu and was treated with steroids, azithro and tamiflu. He reports 2 days prior to admission she became more lethargic and fatigued. The  reports she is a W6OU and is on trulicity weekly. He reports her symptoms worsened the day of arrival as she was starring off and was unresponsive.       History obtained from: Patient, ED provider, EMR and family\"    Behavior / Cognition: cooperative, pleasant mood, confused, and lethargic    Primary Complaint: AMS      Pain: The patient does not complain of pain     Vitals/labs: most recent reading  SpO2: 96%  RR: 14  BP: 114/76  HR: 59    Vision / Hearing:  Vision: Wears glasses for reading  Hearing: WFL    Previous Level of Function:  Living Status: Spouse  Homemaking Responsibilities:   Meal Prep Responsibility: Primary  Laundry Responsibility: Primary  Cleaning Responsibility: Primary  Bill Paying / Finance Responsibility: shared  Shopping Responsibility: Primary  Health Care Management: Primary  Active : yes  Mode of Transportation: SUV  Additional Comments: pt has two teenage children       Assessment   Cognitive

## 2023-11-24 NOTE — PROGRESS NOTES
Physical Therapy  Facility/Department: [facility] C3 TELE/MED SURG/ONC  Physical Therapy Initial Assessment/Treatment     Name: Roxanna Coles  : 1980  MRN: [id number]  Date of Service: 2023    Discharge Recommendations:  24 hour supervision or assist, Home with Home health PT   PT Equipment Recommendations  Equipment Needed: Yes  Mobility Devices: Caffie Ar: Rolling      Patient Diagnosis(es): The primary encounter diagnosis was Diabetic ketoacidosis with coma associated with other specified diabetes mellitus ([address] Diagnoses of Altered mental status, unspecified altered mental status type and Diabetic ketoacidosis with coma associated with type 2 diabetes mellitus ([address]) were also pertinent to this visit. Past Medical History:  has a past medical history of DEREK (acute kidney injury) ([address]), Allergic rhinitis, Alopecia, Asthma, Fatigue, GERD (gastroesophageal reflux disease), Mechanical complication of hip prosthesis ([address]), Morbid obesity ([address]), and Myotonic muscular dystrophy ([address]). Past Surgical History:  has a past surgical history that includes pacemaker placement ();  section (x 2); Nasal septum surgery; joint replacement; joint replacement (12); Cardiac surgery; and Cholecystectomy, laparoscopic (N/A, 2018). Assessment   Body Structures, Functions, Activity Limitations Requiring Skilled Therapeutic Intervention: Decreased functional mobility ; Decreased endurance;Decreased posture;Decreased balance;Decreased safe awareness;Decreased strength  Assessment: Pt to [facility] with diagnosis of diabetic ketoacidosis with coma. PTA pt lives with her spouse and 2 sons (15 and 22 yo). Pt was independent with transfers and ambulation with no AD. Per spouse she would be able to have 24/ supervision/assist if needed. Pt currently presents below her baseline function.  Pt currently requires CGA for bed mobility, CGA for transfers, and CGA for ambulation with RW and min to mod A for ambulation with no AD due to unsteady gait. Pt will continue to benefit from skilled PT services to address current deficits. It is anticipated pt will be safe to DC home with 24/7 assist and HHPT when deemed medically appropriate. Pt will also benefit from RW at home to decreased fall risk. Seen as co-eval with OT in order to safely assess highest level of function and to optimize functional outcomes.      Therapy Prognosis: Good  Decision Making: Medium Complexity  Barriers to Learning: None  Requires PT Follow-Up: Yes  Activity Tolerance  Activity Tolerance: Patient tolerated evaluation without incident;Patient tolerated treatment well;Patient limited by fatigue     Plan   Physical Therapy Plan  General Plan: 3-5 times per week  Current Treatment Recommendations: Strengthening, ROM, Balance training, Gait training, Functional mobility training, Stair training, Home exercise program, Therapeutic activities, Safety education & training, Transfer training, Patient/Caregiver education & training, Endurance training, Equipment evaluation, education, & procurement  Safety Devices  Type of Devices: Patient at risk for falls, All fall risk precautions in place, Left in chair, Call light within reach, Gait belt, Nurse notified (RN cleared no chair alarm)  Restraints  Restraints Initially in Place: No     Restrictions  Restrictions/Precautions  Restrictions/Precautions: Fall Risk, Up as Tolerated  Required Braces or Orthoses?: No  Position Activity Restriction  Other position/activity restrictions: IV, tele     Subjective   Pain: pt denies pain  General  Chart Reviewed: Yes  Patient assessed for rehabilitation services?: Yes  Response To Previous Treatment: Not applicable  Family / Caregiver Present: Yes ( and friend)  Referring Practitioner: Greg Davalos MD  Referral Date : 11/24/23  Diagnosis: Diabetic ketoacidosis with coma  Follows Commands: Within Functional Limits  General Comment  Comments: Pt in bed

## 2023-11-24 NOTE — PROGRESS NOTES
Occupational Therapy  Facility/Department: [facility] C3 TELE/MED SURG/ONC  Occupational Therapy Initial Assessment and Treatment Note    Name: Delfina Joe  : 1980  MRN: [id number]  Date of Service: 2023    Discharge Recommendations:  Home with Home health OT, 24 hour supervision or assist  OT Equipment Recommendations  Equipment Needed:  (CTA)     If pt is unable to be seen after this session, please let this note serve as discharge summary. Please see case management note for discharge disposition. Thank you. Patient Diagnosis(es): The primary encounter diagnosis was Diabetic ketoacidosis with coma associated with other specified diabetes mellitus ([address]). Diagnoses of Altered mental status, unspecified altered mental status type and Diabetic ketoacidosis with coma associated with type 2 diabetes mellitus ([address]) were also pertinent to this visit. Past Medical History:  has a past medical history of DEREK (acute kidney injury) ([address]), Allergic rhinitis, Alopecia, Asthma, Fatigue, GERD (gastroesophageal reflux disease), Mechanical complication of hip prosthesis ([address]), Morbid obesity ([address]), and Myotonic muscular dystrophy ([address]). Past Surgical History:  has a past surgical history that includes pacemaker placement ();  section (x 2); Nasal septum surgery; joint replacement; joint replacement (12); Cardiac surgery; and Cholecystectomy, laparoscopic (N/A, 2018). Assessment   Performance deficits / Impairments: Decreased functional mobility ; Decreased ADL status; Decreased strength;Decreased endurance;Decreased cognition;Decreased safe awareness;Decreased balance;Decreased posture;Decreased fine motor control  Assessment: Pt was admitted to [facility] for diabetic ketoacidosis with coma. At baseline, pt is IND with ADLs, IADLs, functional mobility, and functional transfers. Today, pt required CGA for bed mobility, functional transfers, and functional mobility with RW.  She required

## 2023-11-24 NOTE — CARE COORDINATION
CM update: LOS # 3; Pulmonology; Speech and Neurology all following patient. Waiting on spouse to bring in pacemaker card to see if pacer is compatible to have MRI to r/o CVA. EEG has been ordered. Pt/OT to evaluate. Will follow for potential needs. Jose Armando Grewal RN

## 2023-11-24 NOTE — PROGRESS NOTES
Hospital Medicine Progress Note      Date of Admission: 11/20/2023  Hospital Day: 5      Chief Admission Complaint:  AMS     Subjective:  remains more responsive this morning, family at bedside,      Presenting Admission History:        Patient is a 37 y.o. female with a PMHx of T2DM who presented to the ED with AMS. Per the  the patient was recently diagnosed with the Flu and was treated with steroids, azithro and tamiflu. He reports 2 days prior to admission she became more lethargic and fatigued. The  reports she is a J2OW and is on trulicity weekly. He reports her symptoms worsened the day of arrival as she was starring off and was unresponsive. History obtained from: Patient, ED provider, EMR and family     Assessment/Plan:      Current Principal Problem:  Diabetic ketoacidosis with coma associated with type 2 diabetes mellitus ([address])      DKA with coma 2/2 T2DM  Hyperkalemia  Sinusitis   - Presents due to AMS, recently treated for Flu with tamiflu and steroids   -On arrival, hypothermic, labs suggestive of DKA with elevated BS, AGMA and pH of 6.9 and hyperkalemia   -CXR, UA and lipase unremarkable. CTH shows evidence of sinusitis, however suspect leukocytosis to be reactive and 2/2 to recent steroids use. Likely etiology/trigger is recent Flu infection precipitated by steroid use and sinusitis.  Will empirically treat for sinusitis as patient unable to provide history    -started DKA protocol  -BMP q4h  -POCT q1h  -Neuro checks   -Unasyn for sinusitis pending clinical improvement    -A1c 12.5    Acute encephalopathy- resolved, initially thought to be 2/2 above  -pulm/cc consulted, apprec assistance in mgmt  -MRI ordered but not able to do initially as pt needs to communicate given pacer hx, also noted that pacer not compatible per neuro   -neuro consulted, apprec recs,    -SLP cog eval done 11/24, noted mod-severe deficit  -EEG was ordered    DEREK  -Cr 1.5 on admit (baseline ~0.6)  -Likely Change in code status:    [] Decision to escalate care:    [] Major surgery/procedure with associated risk factors:    ----------------------------------------------------------------------  C. Data (any 2)  [x] Discussed current management and discharge planning options with Case Management. [] Discussed management of the case with:    [] Telemetry personally reviewed and interpreted as documented above    [] Imaging personally reviewed and interpreted, includes:    [x] Data Review (any 3)  [] Collateral history obtained from:    [x] All available Consultant notes from yesterday/today were reviewed  [x] All current labs were reviewed and interpreted for clinical significance   [x] Appropriate follow-up labs were ordered    Medications:  Personally reviewed in detail in conjunction w/ labs as documented for evidence of drug toxicity. Infusion Medications    dextrose      sodium chloride Stopped (11/21/23 1304)     Scheduled Medications    insulin glargine  0.25 Units/kg SubCUTAneous Nightly    insulin lispro  0.08 Units/kg SubCUTAneous TID WC    insulin lispro  0-8 Units SubCUTAneous TID WC    insulin lispro  0-4 Units SubCUTAneous Nightly    sodium chloride flush  5-40 mL IntraVENous 2 times per day    enoxaparin  40 mg SubCUTAneous Daily    ampicillin-sulbactam  3,000 mg IntraVENous Q6H    pantoprazole  40 mg IntraVENous Daily     PRN Meds: calcium carbonate, ondansetron, glucose, dextrose bolus **OR** dextrose bolus, glucagon (rDNA), dextrose, potassium chloride, magnesium sulfate, dextrose bolus **OR** dextrose bolus, sodium chloride flush, sodium chloride, polyethylene glycol, acetaminophen **OR** acetaminophen     Labs:  Personally reviewed and interpreted for clinical significance.      Recent Labs     11/22/23  0500 11/23/23  0630   WBC 11.8* 8.4   HGB 15.3 14.0   HCT 44.7 41.0    171     Recent Labs     11/22/23  0815 11/22/23  1230 11/22/23  1630 11/23/23  0630   * 151* 153* 149*   K 3.2* 3. 0* 3.1* 3.8   * 119* 121* 115*   CO2 20* 20* 20* 22   BUN 18 14 11 9   CREATININE 0.9 0.8 0.7 0.6   CALCIUM 8.3 8.2* 8.1* 8.1*   MG 2.00 1.90 1.80  --    PHOS 1.0*  1.1* 1.1*  1.2* 0.8*  0.8*  --      No results for input(s): \"PROBNP\", \"TROPHS\" in the last 72 hours. No results for input(s): \"LABA1C\" in the last 72 hours. Recent Labs     11/22/23  0500 11/23/23  0630   AST 18 21   ALT 18 17   BILITOT 0.4 0.7   ALKPHOS 98 91     Recent Labs     11/23/23  0630   INR 1.27*       Urine Cultures: No results found for: \"LABURIN\"  Blood Cultures:   Lab Results   Component Value Date/Time    [facility]  11/21/2023 12:12 AM     No Growth to date. Any change in status will be called. Lab Results   Component Value Date/Time    BLOODCULT2  11/21/2023 04:31 AM     No Growth to date. Any change in status will be called.      Organism: No results found for: \"ORG\"      Carlos Hicks MD

## 2023-11-24 NOTE — PROGRESS NOTES
4 Eyes Skin Assessment     The patient is being assess for  Transfer to New Unit    I agree that 2 RN's have performed a thorough Head to Toe Skin Assessment on the patient. ALL assessment sites listed below have been assessed. Areas assessed by both nurses: Edvin Johnson LPN and Flory Shi RN  [x]   Head, Face, and Ears   [x]   Shoulders, Back, and Chest  [x]   Arms, Elbows, and Hands   [x]   Coccyx, Sacrum, and IschIum  [x]   Legs, Feet, and Heels        Does the Patient have Skin Breakdown?   No         Hay Prevention initiated:  No   Wound Care Orders initiated:  No      [facility] nurse consulted for Pressure Injury (Stage 3,4, Unstageable, DTI, NWPT, and Complex wounds), New and Established Ostomies:  No      Nurse 1 eSignature: Electronically signed by Lily David LPN on 73/77/64 at 3:80 AM EST    **SHARE this note so that the co-signing nurse is able to place an eSignature**    Nurse 2 eSignature: {Esignature:[id number]}

## 2023-11-24 NOTE — PROGRESS NOTES
Speech Language Pathology  Dysphagia Treatment/Follow-Up Note  Facility/Department: [place] [address] TELE/MED SURG/ONC    Recommendations:  Diet recommendation: IDDSI 7 Easy to Chew Solids; IDDSI 0 Thin Liquids; Meds PO as tolerated  Instrumentation: Not clinically indicated at this time. Will continue to monitor  Risk management: upright for all intake, stay upright for at least 30 mins after intake, small bites/sips, assist feed, close supervision with intake, oral care 2-3x/day to reduce adverse affects in the event of aspiration, increase physical mobility as able, alternate bites/sips, slow rate of intake, STRICT aspiration precautions, and hold PO and contact SLP if s/s of aspiration or worsening respiratory status develop. Shanique Warner  : 1980 (37 y.o.)   MRN: [id number]  ROOM: [id number]  ADMISSION DATE: 2023  PATIENT DIAGNOSIS(ES): Altered mental status, unspecified altered mental status type [R41.82]  Diabetic ketoacidosis with coma associated with type 2 diabetes mellitus ([address]) [E11.11]  Diabetic ketoacidosis with coma associated with other specified diabetes mellitus ([facility]) [E13.11]  Allergies   Allergen Reactions    Erythromycin Nausea And Vomiting and Nausea Only    Metformin Nausea And Vomiting       DATE ONSET: 2023    Pain: The patient does not complain of pain       Current Diet: ADULT DIET; Dysphagia - Soft and Bite Sized      Diet Tolerance:  Patient tolerating current diet level without signs/symptoms of aspiration.     Dysphagia Treatment and Impressions:  Subjective: Pt seen in room at bedside with RN permission   Behavior / Cognition: Pt alert and cooperative   RN Report/Chart Review: RN reports pt tolerating current diet recommendations  Patient tolerance: Pt tolerating current diet texture consistencies     Baseline Respiratory Status Measures: Pt with SPO2% of 96 on RA with RR of 14    Liquid PO Trials:    IDDSI 0 Thin:  Assessed via cup: no anterior bolus loss ,

## 2023-11-24 NOTE — PROGRESS NOTES
Assessment completed and documented. VSS. A/ox4. Denies pain this morning. Fem line removed at 1010, patient currently laying flat and instructed to do so for 30 minutes. Family in room, denies questions. Pt/ot orders, patient has not been OOB since admission due to being in ICU. Plan to take out hurst cath later this afternoon. Bed locked and in lowest position. Bedside table and call light within reach. Denies further needs at this time.     @1140 removed hurst catheter

## 2023-11-25 VITALS
BODY MASS INDEX: 36.82 KG/M2 | DIASTOLIC BLOOD PRESSURE: 88 MMHG | HEART RATE: 85 BPM | WEIGHT: 221 LBS | SYSTOLIC BLOOD PRESSURE: 136 MMHG | TEMPERATURE: 98 F | OXYGEN SATURATION: 95 % | HEIGHT: 65 IN | RESPIRATION RATE: 18 BRPM

## 2023-11-25 LAB
BACTERIA BLD CULT ORG #2: NORMAL
BACTERIA BLD CULT: NORMAL
GLUCOSE BLD-MCNC: 196 MG/DL (ref 70–99)
GLUCOSE BLD-MCNC: 242 MG/DL (ref 70–99)
GLUCOSE BLD-MCNC: 329 MG/DL (ref 70–99)
PERFORMED ON: ABNORMAL

## 2023-11-25 PROCEDURE — 6360000002 HC RX W HCPCS: Performed by: STUDENT IN AN ORGANIZED HEALTH CARE EDUCATION/TRAINING PROGRAM

## 2023-11-25 PROCEDURE — 6370000000 HC RX 637 (ALT 250 FOR IP): Performed by: INTERNAL MEDICINE

## 2023-11-25 PROCEDURE — 6370000000 HC RX 637 (ALT 250 FOR IP): Performed by: HOSPITALIST

## 2023-11-25 PROCEDURE — 2580000003 HC RX 258: Performed by: STUDENT IN AN ORGANIZED HEALTH CARE EDUCATION/TRAINING PROGRAM

## 2023-11-25 PROCEDURE — 6370000000 HC RX 637 (ALT 250 FOR IP): Performed by: STUDENT IN AN ORGANIZED HEALTH CARE EDUCATION/TRAINING PROGRAM

## 2023-11-25 RX ORDER — AMOXICILLIN AND CLAVULANATE POTASSIUM 500; 125 MG/1; MG/1
1 TABLET, FILM COATED ORAL EVERY 12 HOURS SCHEDULED
Status: DISCONTINUED | OUTPATIENT
Start: 2023-11-25 | End: 2023-11-25 | Stop reason: HOSPADM

## 2023-11-25 RX ORDER — PANTOPRAZOLE SODIUM 40 MG/1
40 TABLET, DELAYED RELEASE ORAL
Status: DISCONTINUED | OUTPATIENT
Start: 2023-11-26 | End: 2023-11-25

## 2023-11-25 RX ORDER — PANTOPRAZOLE SODIUM 40 MG/1
40 TABLET, DELAYED RELEASE ORAL
Status: DISCONTINUED | OUTPATIENT
Start: 2023-11-25 | End: 2023-11-25 | Stop reason: HOSPADM

## 2023-11-25 RX ORDER — AMOXICILLIN AND CLAVULANATE POTASSIUM 500; 125 MG/1; MG/1
1 TABLET, FILM COATED ORAL EVERY 12 HOURS SCHEDULED
Qty: 3 TABLET | Refills: 0 | Status: SHIPPED | OUTPATIENT
Start: 2023-11-25 | End: 2023-11-27

## 2023-11-25 RX ORDER — WATER 10 ML/10ML
INJECTION INTRAMUSCULAR; INTRAVENOUS; SUBCUTANEOUS
Status: DISCONTINUED
Start: 2023-11-25 | End: 2023-11-25 | Stop reason: HOSPADM

## 2023-11-25 RX ORDER — INSULIN LISPRO 100 [IU]/ML
INJECTION, SOLUTION INTRAVENOUS; SUBCUTANEOUS
Qty: 5 ADJUSTABLE DOSE PRE-FILLED PEN SYRINGE | Refills: 0 | Status: SHIPPED | OUTPATIENT
Start: 2023-11-25

## 2023-11-25 RX ORDER — INSULIN LISPRO 100 [IU]/ML
0-4 INJECTION, SOLUTION INTRAVENOUS; SUBCUTANEOUS NIGHTLY
Status: DISCONTINUED | OUTPATIENT
Start: 2023-11-25 | End: 2023-11-25 | Stop reason: HOSPADM

## 2023-11-25 RX ORDER — SACCHAROMYCES BOULARDII 250 MG
250 CAPSULE ORAL 2 TIMES DAILY
Qty: 14 CAPSULE | Refills: 0 | Status: SHIPPED | OUTPATIENT
Start: 2023-11-25 | End: 2023-12-02

## 2023-11-25 RX ORDER — INSULIN GLARGINE 100 [IU]/ML
27 INJECTION, SOLUTION SUBCUTANEOUS NIGHTLY
Qty: 5 ADJUSTABLE DOSE PRE-FILLED PEN SYRINGE | Refills: 1 | Status: SHIPPED | OUTPATIENT
Start: 2023-11-25

## 2023-11-25 RX ORDER — INSULIN LISPRO 100 [IU]/ML
0-16 INJECTION, SOLUTION INTRAVENOUS; SUBCUTANEOUS
Status: DISCONTINUED | OUTPATIENT
Start: 2023-11-25 | End: 2023-11-25 | Stop reason: HOSPADM

## 2023-11-25 RX ORDER — PANTOPRAZOLE SODIUM 40 MG/10ML
40 INJECTION, POWDER, LYOPHILIZED, FOR SOLUTION INTRAVENOUS DAILY
Status: DISCONTINUED | OUTPATIENT
Start: 2023-11-25 | End: 2023-11-25

## 2023-11-25 RX ADMIN — AMPICILLIN SODIUM AND SULBACTAM SODIUM 3000 MG: 2; 1 INJECTION, POWDER, FOR SOLUTION INTRAMUSCULAR; INTRAVENOUS at 00:39

## 2023-11-25 RX ADMIN — ACETAMINOPHEN 650 MG: 325 TABLET ORAL at 05:16

## 2023-11-25 RX ADMIN — ACETAMINOPHEN 650 MG: 325 TABLET ORAL at 09:55

## 2023-11-25 RX ADMIN — INSULIN LISPRO 8 UNITS: 100 INJECTION, SOLUTION INTRAVENOUS; SUBCUTANEOUS at 16:31

## 2023-11-25 RX ADMIN — PANTOPRAZOLE SODIUM 40 MG: 40 TABLET, DELAYED RELEASE ORAL at 11:35

## 2023-11-25 RX ADMIN — INSULIN LISPRO 8 UNITS: 100 INJECTION, SOLUTION INTRAVENOUS; SUBCUTANEOUS at 11:34

## 2023-11-25 RX ADMIN — ENOXAPARIN SODIUM 40 MG: 100 INJECTION SUBCUTANEOUS at 09:38

## 2023-11-25 RX ADMIN — INSULIN LISPRO 6 UNITS: 100 INJECTION, SOLUTION INTRAVENOUS; SUBCUTANEOUS at 11:35

## 2023-11-25 RX ADMIN — INSULIN LISPRO 4 UNITS: 100 INJECTION, SOLUTION INTRAVENOUS; SUBCUTANEOUS at 16:32

## 2023-11-25 RX ADMIN — INSULIN LISPRO 8 UNITS: 100 INJECTION, SOLUTION INTRAVENOUS; SUBCUTANEOUS at 09:38

## 2023-11-25 RX ADMIN — AMPICILLIN SODIUM AND SULBACTAM SODIUM 3000 MG: 2; 1 INJECTION, POWDER, FOR SOLUTION INTRAMUSCULAR; INTRAVENOUS at 05:44

## 2023-11-25 ASSESSMENT — PAIN SCALES - WONG BAKER

## 2023-11-25 ASSESSMENT — PAIN DESCRIPTION - LOCATION
LOCATION: HEAD
LOCATION: HEAD

## 2023-11-25 ASSESSMENT — PAIN - FUNCTIONAL ASSESSMENT: PAIN_FUNCTIONAL_ASSESSMENT: ACTIVITIES ARE NOT PREVENTED

## 2023-11-25 ASSESSMENT — PAIN DESCRIPTION - ORIENTATION: ORIENTATION: MID

## 2023-11-25 ASSESSMENT — PAIN SCALES - GENERAL
PAINLEVEL_OUTOF10: 6
PAINLEVEL_OUTOF10: 3

## 2023-11-25 ASSESSMENT — PAIN DESCRIPTION - DESCRIPTORS
DESCRIPTORS: ACHING
DESCRIPTORS: ACHING

## 2023-11-25 NOTE — PLAN OF CARE
Problem: Discharge Planning  Goal: Discharge to home or other facility with appropriate resources  11/25/2023 0366 by Daria James RN  Outcome: Adequate for Discharge  11/25/2023 0444 by Chandra Castellon  Outcome: Progressing  Flowsheets (Taken 11/25/2023 0444)  Discharge to home or other facility with appropriate resources:   Identify discharge learning needs (meds, wound care, etc)   Identify barriers to discharge with patient and caregiver   Arrange for needed discharge resources and transportation as appropriate   Arrange for interpreters to assist at discharge as needed   Refer to discharge planning if patient needs post-hospital services based on physician order or complex needs related to functional status, cognitive ability or social support system     Problem: Pain  Goal: Verbalizes/displays adequate comfort level or baseline comfort level  11/25/2023 9410 by Daria James RN  Outcome: Adequate for Discharge  11/25/2023 1360 by Daria James RN  Outcome: Progressing  11/25/2023 0444 by Chandra Castellon  Outcome: Progressing  Flowsheets (Taken 11/25/2023 0444)  Verbalizes/displays adequate comfort level or baseline comfort level:   Encourage patient to monitor pain and request assistance   Implement non-pharmacological measures as appropriate and evaluate response   Notify Licensed Independent Practitioner if interventions unsuccessful or patient reports new pain   Consider cultural and social influences on pain and pain management   Administer analgesics based on type and severity of pain and evaluate response   Assess pain using appropriate pain scale

## 2023-11-25 NOTE — PLAN OF CARE
Problem: Discharge Planning  Goal: Discharge to home or other facility with appropriate resources  11/25/2023 1719 by Oliverio Smith RN  Outcome: Adequate for Discharge  11/25/2023 5347 by Ivonne Kat RN  Outcome: Adequate for Discharge  11/25/2023 0444 by Danis Zhu  Outcome: Progressing  Flowsheets (Taken 11/25/2023 0444)  Discharge to home or other facility with appropriate resources:   Identify discharge learning needs (meds, wound care, etc)   Identify barriers to discharge with patient and caregiver   Arrange for needed discharge resources and transportation as appropriate   Arrange for interpreters to assist at discharge as needed   Refer to discharge planning if patient needs post-hospital services based on physician order or complex needs related to functional status, cognitive ability or social support system     Problem: Pain  Goal: Verbalizes/displays adequate comfort level or baseline comfort level  11/25/2023 1719 by Oliverio Smith RN  Outcome: Adequate for Discharge  11/25/2023 3884 by Ivonne Kat RN  Outcome: Adequate for Discharge  11/25/2023 6116 by Ivonne Kat RN  Outcome: Progressing  11/25/2023 0444 by Danis Zhu  Outcome: Progressing  Flowsheets (Taken 11/25/2023 0444)  Verbalizes/displays adequate comfort level or baseline comfort level:   Encourage patient to monitor pain and request assistance   Implement non-pharmacological measures as appropriate and evaluate response   Notify Licensed Independent Practitioner if interventions unsuccessful or patient reports new pain   Consider cultural and social influences on pain and pain management   Administer analgesics based on type and severity of pain and evaluate response   Assess pain using appropriate pain scale     Problem: Skin/Tissue Integrity  Goal: Absence of new skin breakdown  Description: 1. Monitor for areas of redness and/or skin breakdown  2. Assess vascular access sites hourly  3.   Every 4-6 hours minimum:  Change oxygen saturation probe site  4. Every 4-6 hours:  If on nasal continuous positive airway pressure, respiratory therapy assess nares and determine need for appliance change or resting period.   11/25/2023 1719 by Koirn Trujillo RN  Outcome: Adequate for Discharge  11/25/2023 5091 by Octaviano Benavidez RN  Outcome: Adequate for Discharge  11/25/2023 3811 by Octaviano Benavidez RN  Outcome: Progressing  11/25/2023 0444 by Ashwin Castro  Outcome: Progressing     Problem: Safety - Adult  Goal: Free from fall injury  11/25/2023 1719 by Korin Trujillo RN  Outcome: Adequate for Discharge  11/25/2023 2433 by Octaviano Benavidez RN  Outcome: Adequate for Discharge  11/25/2023 0444 by Ashwin Castro  Outcome: Progressing  Flowsheets (Taken 11/25/2023 0444)  Free From Fall Injury:   Instruct family/caregiver on patient safety   Based on caregiver fall risk screen, instruct family/caregiver to ask for assistance with transferring infant if caregiver noted to have fall risk factors

## 2023-11-25 NOTE — PROGRESS NOTES
Diabetes education provided today to  and youngest son Ángela Chen and patient. Patient verbalized understanding. Adan delivered insulin dose for dinner safely and efficiently with this RN and patient's  supervision. Sending patient home with diabetes supplies and extra supplies for practice. Foot care: advised to wash feet daily, pat dry and apply lotion at night, avoiding between toes. Need to look at feet daily and report to a physician any signs of inflammation or skin damage. Discussed diabetes shoes and socks. Diabetic nephropathy: Kidney function, microalbumin as a sign of diabetic nephropathy. Stages of kidney disease. Carbs: good carbs and bad carbs, importance of carb counting, incorporation of protein with each meal to reduce Glycemic index, importance of portions, Carb/insulin ratio. Physical activity: advised to exercise 5-7 days a week 30-60 mins at least. Discussed how it affects BS readings. Continuous Glucose monitor. How it works and checks blood sugars every 5 min. for 4 days during our tests. Steroids and effects on blood sugars. Managing high and low sugar readings. Rotation of sites for subcutaneous medication injection. Discharge education provided both verbally and written, patient verbalized understanding, denies questions. Tele monitor removed, [facility] notified. PIV removed without complications. Patient left with all belongings including phone, , cpap machine and diabetic supplies. VSS. Patient left with  and son via private vehicle.

## 2023-11-25 NOTE — PROGRESS NOTES
Pt alert and oriented. VSS. Denies pain at this time. Denies need at this time. Pt required to be reminded to use the call light for safety during the night, getting up multiple times without assistance. Bed alarm on for safety. Pt resting at this time. Call bell in reach.

## 2023-11-25 NOTE — PLAN OF CARE
Problem: Discharge Planning  Goal: Discharge to home or other facility with appropriate resources  Outcome: Progressing  Flowsheets (Taken 11/25/2023 0444)  Discharge to home or other facility with appropriate resources:   Identify discharge learning needs (meds, wound care, etc)   Identify barriers to discharge with patient and caregiver   Arrange for needed discharge resources and transportation as appropriate   Arrange for interpreters to assist at discharge as needed   Refer to discharge planning if patient needs post-hospital services based on physician order or complex needs related to functional status, cognitive ability or social support system     Problem: Pain  Goal: Verbalizes/displays adequate comfort level or baseline comfort level  Outcome: Progressing  Flowsheets (Taken 11/25/2023 0444)  Verbalizes/displays adequate comfort level or baseline comfort level:   Encourage patient to monitor pain and request assistance   Implement non-pharmacological measures as appropriate and evaluate response   Notify Licensed Independent Practitioner if interventions unsuccessful or patient reports new pain   Consider cultural and social influences on pain and pain management   Administer analgesics based on type and severity of pain and evaluate response   Assess pain using appropriate pain scale     Problem: Skin/Tissue Integrity  Goal: Absence of new skin breakdown  Description: 1. Monitor for areas of redness and/or skin breakdown  2. Assess vascular access sites hourly  3. Every 4-6 hours minimum:  Change oxygen saturation probe site  4. Every 4-6 hours:  If on nasal continuous positive airway pressure, respiratory therapy assess nares and determine need for appliance change or resting period.   Outcome: Progressing     Problem: Safety - Adult  Goal: Free from fall injury  Outcome: Progressing  Flowsheets (Taken 11/25/2023 0444)  Free From Fall Injury:   Instruct family/caregiver on patient safety   Based on caregiver fall risk screen, instruct family/caregiver to ask for assistance with transferring infant if caregiver noted to have fall risk factors     Problem: Chronic Conditions and Co-morbidities  Goal: Patient's chronic conditions and co-morbidity symptoms are monitored and maintained or improved  Outcome: Progressing  Flowsheets (Taken 11/25/2023 4464)  Care Plan - Patient's Chronic Conditions and Co-Morbidity Symptoms are Monitored and Maintained or Improved:   Collaborate with multidisciplinary team to address chronic and comorbid conditions and prevent exacerbation or deterioration   Monitor and assess patient's chronic conditions and comorbid symptoms for stability, deterioration, or improvement   Update acute care plan with appropriate goals if chronic or comorbid symptoms are exacerbated and prevent overall improvement and discharge

## 2023-11-25 NOTE — PLAN OF CARE
Problem: Discharge Planning  Goal: Discharge to home or other facility with appropriate resources  11/25/2023 0444 by Rio Solorio  Outcome: Progressing  Flowsheets (Taken 11/25/2023 0444)  Discharge to home or other facility with appropriate resources:   Identify discharge learning needs (meds, wound care, etc)   Identify barriers to discharge with patient and caregiver   Arrange for needed discharge resources and transportation as appropriate   Arrange for interpreters to assist at discharge as needed   Refer to discharge planning if patient needs post-hospital services based on physician order or complex needs related to functional status, cognitive ability or social support system     Problem: Pain  Goal: Verbalizes/displays adequate comfort level or baseline comfort level  11/25/2023 0047 by José Lacey RN  Outcome: Progressing  11/25/2023 0444 by Rio Solorio  Outcome: Progressing  Flowsheets (Taken 11/25/2023 0444)  Verbalizes/displays adequate comfort level or baseline comfort level:   Encourage patient to monitor pain and request assistance   Implement non-pharmacological measures as appropriate and evaluate response   Notify Licensed Independent Practitioner if interventions unsuccessful or patient reports new pain   Consider cultural and social influences on pain and pain management   Administer analgesics based on type and severity of pain and evaluate response   Assess pain using appropriate pain scale     Problem: Skin/Tissue Integrity  Goal: Absence of new skin breakdown  Description: 1. Monitor for areas of redness and/or skin breakdown  2. Assess vascular access sites hourly  3. Every 4-6 hours minimum:  Change oxygen saturation probe site  4. Every 4-6 hours:  If on nasal continuous positive airway pressure, respiratory therapy assess nares and determine need for appliance change or resting period.   11/25/2023 8149 by José Lacey RN  Outcome: Progressing  11/25/2023 0444 by Olga Slade  Outcome: Progressing

## 2023-12-04 NOTE — PROGRESS NOTES
Physician Progress Note      PATIENT:               Jose Concepcion  [facility] #:                  [id number]  :                       1980  ADMIT DATE:       2023 11:51 PM  [address] [facility] DATE:        2023 5:50 PM  RESPONDING  PROVIDER #:        Toi Almaraz MD          QUERY TEXT:    Pt admitted with DKA and DEREK and has encephalopathy documented. If possible,   please document in progress notes and discharge summary further specificity   regarding the type of encephalopathy:    The medical record reflects the following:  Risk Factors: DKA, DEREK, recently diagnosed with influenza, sinusitis  Clinical Indicators: DKA with coma, altered mental status, \"Alert and oriented   x0 with minimal responsiveness\" noted in ED, \"Patient's  noted patient   was \"not acting right\" and was unusually somnolent and lethargic which   worsened over the course of -, which culminated with him calling an   ambulance after patient was found on the floor unresponsive late at night on   . \" noted in PN   Treatment: CT, CXR, serial labs, insulin infusion, IV Unasyn, supportive care    Thank you,  Renetta Norris RN, CDS  [email or web address]  Options provided:  -- Metabolic encephalopathy due to multifactorial causes of DKA, DEREK  -- Other - I will add my own diagnosis  -- Disagree - Not applicable / Not valid  -- Disagree - Clinically unable to determine / Unknown  -- Refer to Clinical Documentation Reviewer    PROVIDER RESPONSE TEXT:    This patient has metabolic encephalopathy due to multifactorial causes of   DKA/DEREK.     Query created by: Renetta Norris on 2023 4:13 PM      Electronically signed by:  Toi Almaraz MD 2023 2:18 PM

## 2023-12-19 PROBLEM — I95.1 ORTHOSTATIC HYPOTENSION: Status: RESOLVED | Noted: 2023-12-19 | Resolved: 2023-12-19

## 2023-12-19 PROBLEM — R01.1 CARDIAC MURMUR: Status: ACTIVE | Noted: 2023-12-19

## 2023-12-19 PROBLEM — R42 ORTHOSTATIC DIZZINESS: Status: ACTIVE | Noted: 2023-12-19

## 2023-12-19 PROBLEM — I47.10 SVT (SUPRAVENTRICULAR TACHYCARDIA): Status: ACTIVE | Noted: 2023-12-19

## 2023-12-19 PROBLEM — I95.1 ORTHOSTATIC HYPOTENSION: Status: ACTIVE | Noted: 2023-12-19

## 2024-01-09 ENCOUNTER — OFFICE VISIT (OUTPATIENT)
Dept: CARDIOLOGY CLINIC | Age: 44
End: 2024-01-09

## 2024-01-09 ENCOUNTER — HOSPITAL ENCOUNTER (OUTPATIENT)
Age: 44
Discharge: HOME | End: 2024-01-09
Payer: MEDICARE

## 2024-01-09 ENCOUNTER — NURSE ONLY (OUTPATIENT)
Dept: CARDIOLOGY CLINIC | Age: 44
End: 2024-01-09
Payer: MEDICARE

## 2024-01-09 VITALS
HEART RATE: 92 BPM | DIASTOLIC BLOOD PRESSURE: 84 MMHG | BODY MASS INDEX: 37.97 KG/M2 | WEIGHT: 222.4 LBS | SYSTOLIC BLOOD PRESSURE: 118 MMHG | OXYGEN SATURATION: 96 % | HEIGHT: 64 IN

## 2024-01-09 DIAGNOSIS — Z79.899 MEDICATION MANAGEMENT: ICD-10-CM

## 2024-01-09 DIAGNOSIS — Z95.0 PACEMAKER: Primary | ICD-10-CM

## 2024-01-09 DIAGNOSIS — I47.10 SVT (SUPRAVENTRICULAR TACHYCARDIA): ICD-10-CM

## 2024-01-09 DIAGNOSIS — Z79.899 MEDICATION MANAGEMENT: Primary | ICD-10-CM

## 2024-01-09 DIAGNOSIS — Z95.0 PACEMAKER: ICD-10-CM

## 2024-01-09 LAB — HCG UR QL: NEGATIVE

## 2024-01-09 PROCEDURE — 93280 PM DEVICE PROGR EVAL DUAL: CPT | Performed by: INTERNAL MEDICINE

## 2024-01-09 PROCEDURE — 84703 CHORIONIC GONADOTROPIN ASSAY: CPT

## 2024-01-09 RX ORDER — ROSUVASTATIN CALCIUM 5 MG/1
TABLET, COATED ORAL
COMMUNITY
Start: 2023-12-12

## 2024-01-09 RX ORDER — FLUTICASONE PROPIONATE 50 MCG
1 SPRAY, SUSPENSION (ML) NASAL DAILY
COMMUNITY

## 2024-01-09 NOTE — PATIENT INSTRUCTIONS
RECOMMENDATIONS:  Discussed at length treatment options for AF:   1. Medications to slow heart rate (goal of less than 90 at rest, less than 110 with mild activity)   2. Medications for rhythm control (amiodarone, flecainide, dronedarone, and propafenone or dofetilide or sotalol which requires hospital admission for four days). Discussed risks and benefits.   3. Ablation to burn the abnormal electrical activity within the heart. This is not a cure for AF but will help to suppress it. Discussed risks and benefits.   4. Pace and Ablate to put pacemaker in the heart and burn the electrical activity in the heart. Would be dependent upon pacemaker for the rest of your life. This would be a last resort.   5. Cardioversion to electrically shock heart back into normal rhythm.  Continue oral anticoagulant to prevent stroke in the presence of AF. Eliquis, Xarelto, Coumadin.   Remote device checks every 3 months.   Urine pregnancy test prior to starting any medications.   Plan to start blood thinner after resulted.   Echo as scheduled.   Follow up in 3 months with EP NP.

## 2024-01-09 NOTE — PROGRESS NOTES
[facility]   Electrophysiology Consult Note              Date: 24  Patient Name: Priya Foster  YOB: 1980    Primary Care Physician: Skyler Stover MD    CHIEF COMPLAINT:   Chief Complaint   Patient presents with    New Patient    Device Check    Tachycardia     SVT      HISTORY OF PRESENT ILLNESS: Priya Foster is a 43 y.o. female with a PMH significant for muscular dystrophy, DM, first degree AVB, abnormal EP study in  (s/p pacemaker). Patient has previously been followed by [facility] cardiology.    Today, 2024, Device interrogation demonstrated AP 4.3%,  4.3%, possible AF events, DENISE 3 years. She reports that she is re establishing care with EP. She has not been followed by cardiology in quite some time. Patient denies current edema, chest pain, sob, palpitations, dizziness or syncope.     Past Medical History:   has a past medical history of DEREK (acute kidney injury) ([facility]), Allergic rhinitis, Alopecia, Asthma, Fatigue, GERD (gastroesophageal reflux disease), Mechanical complication of hip prosthesis ([facility]), Morbid obesity ([facility]), Myotonic muscular dystrophy ([facility]), and SVT (supraventricular tachycardia).    Past Surgical History:   has a past surgical history that includes pacemaker placement ();  section (x 2); Nasal septum surgery; joint replacement; joint replacement (12); Cardiac surgery; and Cholecystectomy, laparoscopic (N/A, 2018).     Allergies:  Erythromycin and Metformin    Social History:   reports that she has never smoked. She has never used smokeless tobacco. She reports current drug use. Drug: Marijuana (Weed). She reports that she does not drink alcohol.     Family History: family history is not on file.    Home Medications:    Prior to Admission medications    Medication Sig Start Date End Date Taking? Authorizing Provider   Semaglutide,0.25 or 0.5MG/DOS, 2 MG/3ML SOPN Inject 0.5 mg into the skin every 7 days 23  Yes Provider, 
Exam    Data:  ECG: {ekg findings:657263}  ECHO: ***  {Chest X-ray (Optional):52502}     Lab Review   Lab Results   Component Value Date/Time    WBC 8.4 11/23/2023 06:30 AM    RBC 4.65 11/23/2023 06:30 AM    HGB 14.0 11/23/2023 06:30 AM    HCT 41.0 11/23/2023 06:30 AM    MCV 88.1 11/23/2023 06:30 AM    MCH 30.2 11/23/2023 06:30 AM    MCHC 34.2 11/23/2023 06:30 AM    RDW 13.6 11/23/2023 06:30 AM     11/23/2023 06:30 AM    MPV 7.6 11/23/2023 06:30 AM      Lab Results   Component Value Date/Time     11/23/2023 06:30 AM    K 3.8 11/23/2023 06:30 AM     11/23/2023 06:30 AM    CO2 22 11/23/2023 06:30 AM    BUN 9 11/23/2023 06:30 AM    CREATININE 0.6 11/23/2023 06:30 AM    GLUCOSE 224 11/23/2023 06:30 AM    CALCIUM 8.1 11/23/2023 06:30 AM    PROT 5.2 11/23/2023 06:30 AM    LABALBU 2.7 11/23/2023 06:30 AM    BILITOT 0.7 11/23/2023 06:30 AM    AST 21 11/23/2023 06:30 AM    ALT 17 11/23/2023 06:30 AM      Lab Results   Component Value Date/Time     11/23/2023 06:30 AM    K 3.8 11/23/2023 06:30 AM     11/23/2023 06:30 AM    CO2 22 11/23/2023 06:30 AM    BUN 9 11/23/2023 06:30 AM    CREATININE 0.6 11/23/2023 06:30 AM    GLUCOSE 224 11/23/2023 06:30 AM    CALCIUM 8.1 11/23/2023 06:30 AM      Lab Results   Component Value Date/Time    ALKPHOS 91 11/23/2023 06:30 AM    ALT 17 11/23/2023 06:30 AM    AST 21 11/23/2023 06:30 AM    PROT 5.2 11/23/2023 06:30 AM    BILITOT 0.7 11/23/2023 06:30 AM    BILIDIR <0.2 11/21/2023 12:02 AM    LABALBU 2.7 11/23/2023 06:30 AM      No results found for: \"TSH\", \"TSHREFLEX\", \"TSHFT4\", \"TSHELE\", \"YUR5DRT\", \"TSHHS\"     [id number] Score: 2  Disclaimer: Risk Score calculation is dependent on accuracy of patient problem list and past encounter diagnosis.          HAS-BLED Score                            Risk Factors      Points   Hypertension  Uncontrolled, >160 mmHg systolic   {Hypertension:200938331}   Renal disease  Dialysis, transplant, Cr>2.26 mg/dL or >200 µmol/L

## 2024-01-10 DIAGNOSIS — I48.91 ATRIAL FIBRILLATION, UNSPECIFIED TYPE (HCC): Primary | ICD-10-CM

## 2024-01-10 NOTE — TELEPHONE ENCOUNTER
----- Message from STEPHANIE Parra Jr., MD sent at 1/10/2024  2:16 PM EST -----  Please let patient know that pregnancy test is negative.  I'm happy to send in anticoagulation if she would like.  I just need to know her preference.

## 2024-01-10 NOTE — TELEPHONE ENCOUNTER
Called and spoke with patient. She is agreeable to start OAC pending cost. Per [facility] note 1/9/2024 appears was planning to start Xarelto 20mg QD. Patient will contact us and let us know if medication is affordable or if not affordable. If not affordable will need to discuss alternatives.      AGK-Xarelto 20mg QD pending for signoff

## 2024-02-21 ENCOUNTER — HOSPITAL ENCOUNTER (OUTPATIENT)
Dept: CARDIOLOGY | Age: 44
Discharge: HOME | End: 2024-02-21
Attending: INTERNAL MEDICINE
Payer: MEDICARE

## 2024-02-21 DIAGNOSIS — R01.1 CARDIAC MURMUR: ICD-10-CM

## 2024-02-21 DIAGNOSIS — I47.10 SVT (SUPRAVENTRICULAR TACHYCARDIA): ICD-10-CM

## 2024-02-21 PROCEDURE — 93306 TTE W/DOPPLER COMPLETE: CPT

## 2024-02-26 ENCOUNTER — TELEPHONE (OUTPATIENT)
Dept: CARDIOLOGY CLINIC | Age: 44
End: 2024-02-26

## 2024-02-27 NOTE — TELEPHONE ENCOUNTER
Echo images personally reviewed. Confirmed echo density pulmonic valve. Prior admission November with DKA, leukocytosis but all cell lines appeared elevated with sig drop next day indicating dehydration as she suggested at last OV. Blood cultures NGTD. No fevers/malaise endorsed at most recent OV Jan.    Unclear etiolgoy for finding. Rec following:  Labs: ESR/CRP/CBC/repeat blood cultures x 2  Transesophageal echo next available with me     I will attempt to call pt tomorrow to discuss further if we can reach out with initial recs and get testing scheduled.       RIGO

## 2024-02-28 NOTE — PROGRESS NOTES
tibial pulses bilaterally. Brisk carotid upstrokes without carotid bruit.    Skin: Skin color, texture, turgor are normal with no rashes or ulceration.    Pysch: Euthymic mood, appropriate affect   Neurologic: Oriented to person, place and time. No slurred speech or facial asymmetry. No motor or sensory deficits on gross examination.         Labs:   CBC:   Lab Results   Component Value Date/Time    WBC 8.4 11/23/2023 06:30 AM    RBC 4.65 11/23/2023 06:30 AM    HGB 14.0 11/23/2023 06:30 AM    HCT 41.0 11/23/2023 06:30 AM    MCV 88.1 11/23/2023 06:30 AM    RDW 13.6 11/23/2023 06:30 AM     11/23/2023 06:30 AM     CMP:  Lab Results   Component Value Date/Time     11/23/2023 06:30 AM    K 3.8 11/23/2023 06:30 AM     11/23/2023 06:30 AM    CO2 22 11/23/2023 06:30 AM    BUN 9 11/23/2023 06:30 AM    CREATININE 0.6 11/23/2023 06:30 AM    GFRAA >60 02/23/2018 08:44 PM    GFRAA >60 08/22/2012 04:35 AM    AGRATIO 1.1 11/23/2023 06:30 AM    LABGLOM >60 11/23/2023 06:30 AM    GLUCOSE 224 11/23/2023 06:30 AM    PROT 5.2 11/23/2023 06:30 AM    CALCIUM 8.1 11/23/2023 06:30 AM    BILITOT 0.7 11/23/2023 06:30 AM    ALKPHOS 91 11/23/2023 06:30 AM    AST 21 11/23/2023 06:30 AM    ALT 17 11/23/2023 06:30 AM     PT/INR:  No results found for: \"PTINR\"  HgBA1c:  Lab Results   Component Value Date    LABA1C 12.5 11/21/2023     Lab Results   Component Value Date    CKTOTAL 93 11/21/2023     LIPIDS 11/6/23 (T[facility])  , ,  HDL 54,     Cardiac Data:     ECHO 2/22/24  Summary   Normal left ventricle systolic function with an estimated ejection fraction   of 55%.   No regional wall motion abnormalities are seen.   Normal left ventricular diastolic filling pressure.   The pulmonic valve appears to open adequately.   A small echogenic density of unclear etiology is seen on the left leaflet of   the pulmonic valve. Cannot rule out a vegetation, mass, or thrombus.   Consider PATRICE or cardiac MRI to further evaluate.

## 2024-02-28 NOTE — TELEPHONE ENCOUNTER
Dr. Roberts, did you want to speak with patient first.  I can order labs, but what diagnosis do I use.  I will forward on to winston regarding PATRICE

## 2024-02-28 NOTE — TELEPHONE ENCOUNTER
We can see tomorrow and get back with Pratibha. She's on schedule for then. Once we discuss we can get orders squared away and schedule. TY

## 2024-02-29 ENCOUNTER — OFFICE VISIT (OUTPATIENT)
Dept: CARDIOLOGY CLINIC | Age: 44
End: 2024-02-29
Payer: MEDICARE

## 2024-02-29 ENCOUNTER — HOSPITAL ENCOUNTER (OUTPATIENT)
Age: 44
Discharge: HOME | End: 2024-02-29
Payer: MEDICARE

## 2024-02-29 ENCOUNTER — TELEPHONE (OUTPATIENT)
Dept: CARDIOLOGY CLINIC | Age: 44
End: 2024-02-29

## 2024-02-29 VITALS
OXYGEN SATURATION: 97 % | HEART RATE: 83 BPM | HEIGHT: 64 IN | WEIGHT: 222 LBS | BODY MASS INDEX: 37.9 KG/M2 | SYSTOLIC BLOOD PRESSURE: 110 MMHG | DIASTOLIC BLOOD PRESSURE: 70 MMHG

## 2024-02-29 DIAGNOSIS — I37.8 PULMONARY VALVE MASS: Primary | ICD-10-CM

## 2024-02-29 DIAGNOSIS — I37.8 PULMONARY VALVE MASS: ICD-10-CM

## 2024-02-29 DIAGNOSIS — I48.0 PAROXYSMAL ATRIAL FIBRILLATION (HCC): ICD-10-CM

## 2024-02-29 PROBLEM — I33.0: Status: ACTIVE | Noted: 2024-02-29

## 2024-02-29 LAB
CRP SERPL-MCNC: 3.5 MG/L (ref 0–5.1)
ERYTHROCYTE [SEDIMENTATION RATE] IN BLOOD BY WESTERGREN METHOD: 31 MM/HR (ref 0–20)

## 2024-02-29 PROCEDURE — G8427 DOCREV CUR MEDS BY ELIG CLIN: HCPCS | Performed by: INTERNAL MEDICINE

## 2024-02-29 PROCEDURE — 87040 BLOOD CULTURE FOR BACTERIA: CPT

## 2024-02-29 PROCEDURE — 86140 C-REACTIVE PROTEIN: CPT

## 2024-02-29 PROCEDURE — 36415 COLL VENOUS BLD VENIPUNCTURE: CPT

## 2024-02-29 PROCEDURE — 1036F TOBACCO NON-USER: CPT | Performed by: INTERNAL MEDICINE

## 2024-02-29 PROCEDURE — 99214 OFFICE O/P EST MOD 30 MIN: CPT | Performed by: INTERNAL MEDICINE

## 2024-02-29 PROCEDURE — G8484 FLU IMMUNIZE NO ADMIN: HCPCS | Performed by: INTERNAL MEDICINE

## 2024-02-29 PROCEDURE — G8417 CALC BMI ABV UP PARAM F/U: HCPCS | Performed by: INTERNAL MEDICINE

## 2024-02-29 PROCEDURE — 85652 RBC SED RATE AUTOMATED: CPT

## 2024-02-29 RX ORDER — DULAGLUTIDE 0.75 MG/.5ML
0.75 INJECTION, SOLUTION SUBCUTANEOUS
COMMUNITY

## 2024-02-29 NOTE — PATIENT INSTRUCTIONS
PLAN:  Specialty labs -  ESR, CRP, blood cultures x 2 - to be drawn today   PATRICE - to further evaluate the pulmonic valve   Pratibha our  will reach out to you   You will need a       May take all other medications with sips of water

## 2024-02-29 NOTE — TELEPHONE ENCOUNTER
PLAN:    PATRICE - to further evaluate the pulmonic valve   Pratibha our  will reach out to you   You will need a    1/2 dose of insulin the night prior to the procedure    May take all other medications with sips of water

## 2024-03-01 ENCOUNTER — TELEPHONE (OUTPATIENT)
Dept: CARDIOLOGY CLINIC | Age: 44
End: 2024-03-01

## 2024-03-01 LAB
BACTERIA BLD CULT ORG #2: NORMAL
BACTERIA BLD CULT: NORMAL

## 2024-03-01 NOTE — TELEPHONE ENCOUNTER
Called and spoke to patient. Relayed RJM results. She VU. Instructed Pratibha will call to schedule procedure.

## 2024-03-01 NOTE — TELEPHONE ENCOUNTER
Procedure:  PATRICE  Doctor:  Dr. Roberts  Date:  3/14/24  Time:  9am  Arrival:  7:30am  Reps:  n/a  Anesthesia:  Yes      Spoke with patient. Please have patient arrive to the main entrance of [facility] ([address] [place], [place] [id number]) and check in with the registration desk.  They will be directed to the Cath Lab.  Remind patient to be NPO after midnight (8 hours prior). Do not apply lotions/creams on skin the day of procedure.

## 2024-03-01 NOTE — TELEPHONE ENCOUNTER
----- Message from Nakul Roberts MD sent at 3/1/2024  8:30 AM EST -----  Sedimentation rate slightly elevated indicating chronic inflammation. Non-specific finding especially however, with CRP negative (another inflammatory marker). I will call if cultures surprisingly show positivity. I will see her for PATRICE.

## 2024-03-04 LAB
BACTERIA BLD CULT ORG #2: NORMAL
BACTERIA BLD CULT: NORMAL

## 2024-03-14 ENCOUNTER — ANESTHESIA (OUTPATIENT)
Dept: CARDIAC CATH/INVASIVE PROCEDURES | Age: 44
End: 2024-03-14
Payer: MEDICARE

## 2024-03-14 ENCOUNTER — TELEPHONE (OUTPATIENT)
Dept: CARDIOLOGY CLINIC | Age: 44
End: 2024-03-14

## 2024-03-14 ENCOUNTER — HOSPITAL ENCOUNTER (OUTPATIENT)
Dept: CARDIAC CATH/INVASIVE PROCEDURES | Age: 44
Discharge: HOME | End: 2024-03-14
Attending: INTERNAL MEDICINE | Admitting: INTERNAL MEDICINE
Payer: MEDICARE

## 2024-03-14 ENCOUNTER — ANESTHESIA EVENT (OUTPATIENT)
Dept: CARDIAC CATH/INVASIVE PROCEDURES | Age: 44
End: 2024-03-14
Payer: MEDICARE

## 2024-03-14 VITALS
OXYGEN SATURATION: 100 % | DIASTOLIC BLOOD PRESSURE: 89 MMHG | HEART RATE: 77 BPM | WEIGHT: 222 LBS | SYSTOLIC BLOOD PRESSURE: 132 MMHG | HEIGHT: 64 IN | BODY MASS INDEX: 37.9 KG/M2 | RESPIRATION RATE: 19 BRPM

## 2024-03-14 DIAGNOSIS — I33.0: Primary | ICD-10-CM

## 2024-03-14 LAB
ANION GAP SERPL CALCULATED.3IONS-SCNC: 11 MMOL/L (ref 3–16)
BUN SERPL-MCNC: 15 MG/DL (ref 7–20)
CALCIUM SERPL-MCNC: 10 MG/DL (ref 8.3–10.6)
CHLORIDE SERPL-SCNC: 100 MMOL/L (ref 99–110)
CO2 SERPL-SCNC: 26 MMOL/L (ref 21–32)
CREAT SERPL-MCNC: <0.5 MG/DL (ref 0.6–1.1)
DEPRECATED RDW RBC AUTO: 13.7 % (ref 12.4–15.4)
GFR SERPLBLD CREATININE-BSD FMLA CKD-EPI: >60 ML/MIN/{1.73_M2}
GLUCOSE SERPL-MCNC: 148 MG/DL (ref 70–99)
HCG UR QL: NEGATIVE
HCT VFR BLD AUTO: 39 % (ref 36–48)
HGB BLD-MCNC: 13 G/DL (ref 12–16)
MCH RBC QN AUTO: 30 PG (ref 26–34)
MCHC RBC AUTO-ENTMCNC: 33.3 G/DL (ref 31–36)
MCV RBC AUTO: 90.4 FL (ref 80–100)
PLATELET # BLD AUTO: 224 K/UL (ref 135–450)
PMV BLD AUTO: 7.3 FL (ref 5–10.5)
POTASSIUM SERPL-SCNC: 3.8 MMOL/L (ref 3.5–5.1)
RBC # BLD AUTO: 4.32 M/UL (ref 4–5.2)
SODIUM SERPL-SCNC: 137 MMOL/L (ref 136–145)
WBC # BLD AUTO: 6.9 K/UL (ref 4–11)

## 2024-03-14 PROCEDURE — 2500000003 HC RX 250 WO HCPCS: Performed by: NURSE ANESTHETIST, CERTIFIED REGISTERED

## 2024-03-14 PROCEDURE — 3700000000 HC ANESTHESIA ATTENDED CARE: Performed by: ANESTHESIOLOGY

## 2024-03-14 PROCEDURE — 2580000003 HC RX 258: Performed by: NURSE ANESTHETIST, CERTIFIED REGISTERED

## 2024-03-14 PROCEDURE — 93320 DOPPLER ECHO COMPLETE: CPT

## 2024-03-14 PROCEDURE — 3700000001 HC ADD 15 MINUTES (ANESTHESIA): Performed by: ANESTHESIOLOGY

## 2024-03-14 PROCEDURE — 84703 CHORIONIC GONADOTROPIN ASSAY: CPT

## 2024-03-14 PROCEDURE — 6360000002 HC RX W HCPCS: Performed by: NURSE ANESTHETIST, CERTIFIED REGISTERED

## 2024-03-14 PROCEDURE — 93312 ECHO TRANSESOPHAGEAL: CPT

## 2024-03-14 PROCEDURE — 93325 DOPPLER ECHO COLOR FLOW MAPG: CPT

## 2024-03-14 PROCEDURE — 85027 COMPLETE CBC AUTOMATED: CPT

## 2024-03-14 PROCEDURE — 80048 BASIC METABOLIC PNL TOTAL CA: CPT

## 2024-03-14 RX ORDER — SODIUM CHLORIDE 0.9 % (FLUSH) 0.9 %
5-40 SYRINGE (ML) INJECTION EVERY 12 HOURS SCHEDULED
Status: DISCONTINUED | OUTPATIENT
Start: 2024-03-14 | End: 2024-03-14 | Stop reason: HOSPADM

## 2024-03-14 RX ORDER — LIDOCAINE HYDROCHLORIDE 20 MG/ML
INJECTION, SOLUTION EPIDURAL; INFILTRATION; INTRACAUDAL; PERINEURAL PRN
Status: DISCONTINUED | OUTPATIENT
Start: 2024-03-14 | End: 2024-03-14 | Stop reason: SDUPTHER

## 2024-03-14 RX ORDER — SODIUM CHLORIDE, SODIUM LACTATE, POTASSIUM CHLORIDE, CALCIUM CHLORIDE 600; 310; 30; 20 MG/100ML; MG/100ML; MG/100ML; MG/100ML
INJECTION, SOLUTION INTRAVENOUS CONTINUOUS PRN
Status: DISCONTINUED | OUTPATIENT
Start: 2024-03-14 | End: 2024-03-14 | Stop reason: SDUPTHER

## 2024-03-14 RX ORDER — SODIUM CHLORIDE 9 MG/ML
INJECTION, SOLUTION INTRAVENOUS PRN
Status: DISCONTINUED | OUTPATIENT
Start: 2024-03-14 | End: 2024-03-14 | Stop reason: HOSPADM

## 2024-03-14 RX ORDER — SODIUM CHLORIDE 0.9 % (FLUSH) 0.9 %
5-40 SYRINGE (ML) INJECTION PRN
Status: DISCONTINUED | OUTPATIENT
Start: 2024-03-14 | End: 2024-03-14 | Stop reason: HOSPADM

## 2024-03-14 RX ORDER — PROPOFOL 10 MG/ML
INJECTION, EMULSION INTRAVENOUS PRN
Status: DISCONTINUED | OUTPATIENT
Start: 2024-03-14 | End: 2024-03-14 | Stop reason: SDUPTHER

## 2024-03-14 RX ADMIN — PROPOFOL 50 MG: 10 INJECTION, EMULSION INTRAVENOUS at 09:28

## 2024-03-14 RX ADMIN — PROPOFOL 150 MG: 10 INJECTION, EMULSION INTRAVENOUS at 09:16

## 2024-03-14 RX ADMIN — PROPOFOL 50 MG: 10 INJECTION, EMULSION INTRAVENOUS at 09:20

## 2024-03-14 RX ADMIN — LIDOCAINE HYDROCHLORIDE 50 MG: 20 INJECTION, SOLUTION EPIDURAL; INFILTRATION; INTRACAUDAL; PERINEURAL at 09:16

## 2024-03-14 RX ADMIN — SODIUM CHLORIDE, SODIUM LACTATE, POTASSIUM CHLORIDE, AND CALCIUM CHLORIDE: .6; .31; .03; .02 INJECTION, SOLUTION INTRAVENOUS at 08:43

## 2024-03-14 RX ADMIN — PROPOFOL 50 MG: 10 INJECTION, EMULSION INTRAVENOUS at 09:24

## 2024-03-14 NOTE — DISCHARGE INSTRUCTIONS
Cath Labs at  [facility]                      3/14/2024  Priya MARINO Cmehil   Date of Birth 1980     TRANSESOPHAGEAL ECHOCARDIOGRAM DISCHARGE INSTRUCTIONS      You have been given sedation for your procedure so you may not drive, operate any machinery, or sign any legal documentation for 24 hours.    Do not drink or eat for at least 2 hours AND until your gag reflex returns.  Check by touching the back of the tongue to be sure you can gag.  Do not drink any alcohol today.  Eat soft foods at first then gradually return to your normal diet.      CALL YOUR DOCTOR If you should develop a fever of 101, cough up teaspoon amounts of blood, develop severe shortness of breath or chest pain.  If symptoms are severe, go to the emergency room.    Contact your doctor for a follow-up visit and/or results of your procedure.      SEDATION DISCHARGE INSTRUCTION:  For the next 24 hours do not drive a car, operate machinery, power tools or kitchen appliances.  Do not drink alcohol; including beer or wine.  Do not make any important decisions or sign any important papers.  For the next 24 hours you can expect drowsiness, light-headed or dizziness, nausea/ vomiting, inability to concentrate, fatigue and desire to sleep.  We strongly suggest that a responsible adult be with for the next 24 hours, for your protection and safety.  You are not allowed to drive yourself home, or take any type of public transportation.  If any questions, please call your doctor.  If you cannot reach your Doctor then call the Emergency Department at  [phone].  Explain to the Emergency Department the procedure you had performed and they will be able to assist you.  If you seek Emergency Care, bring this form with you.    If your condition worsens or if you have any concerns, call your doctor or seek emergency medical services as needed. If you have any of the following symptoms/conditions, call your doctor.

## 2024-03-14 NOTE — ANESTHESIA PRE PROCEDURE
Frequency Provider Last Rate Last Admin   • sodium chloride flush 0.9 % injection 5-40 mL  5-40 mL IntraVENous 2 times per day Nakul Roberts MD       • sodium chloride flush 0.9 % injection 5-40 mL  5-40 mL IntraVENous PRN Nakul Roberts MD       • 0.9 % sodium chloride infusion   IntraVENous PRN Nakul Roberts MD           Allergies:    Allergies   Allergen Reactions   • Erythromycin Nausea And Vomiting and Nausea Only   • Metformin Nausea And Vomiting       Problem List:    Patient Active Problem List   Diagnosis Code   • Tonsillar hypertrophy J35.1   • Seasonal allergies J30.2   • Myotonic muscular dystrophy ([facility]) G71.11   • Pacemaker Z95.0   • Mechanical complication of hip prosthesis ([facility]) T84.098A, Z96.649   • Right THRevision of acetabulum Z96.649   • Gallstones K80.20   • Diabetic ketoacidosis with coma associated with type 2 diabetes mellitus ([facility]) E11.11   • Acute non-recurrent maxillary sinusitis J01.00   • Bandemia D72.825   • DEREK (acute kidney injury) ([facility]) N17.9   • Class 1 obesity in adult E66.9   • Acute encephalopathy G93.40   • Secondary polycythemia D75.1   • Diabetic acidosis ([facility]) E11.10   • Hypernatremia E87.0   • SVT (supraventricular tachycardia) I47.10   • Cardiac murmur R01.1   • Orthostatic dizziness R42   • 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       Past Medical History:        Diagnosis Date   • DEREK (acute kidney injury) ([facility]) 2023   • Allergic rhinitis    • Alopecia    • Asthma    • Fatigue    • GERD (gastroesophageal reflux disease)    • Mechanical complication of hip prosthesis ([facility]) 2012   • Morbid obesity ([facility])    • Myotonic muscular dystrophy ([facility])    • SVT (supraventricular tachycardia) 2023       Past Surgical History:        Procedure Laterality Date   • CARDIAC SURGERY      pacemaker   •  SECTION  x 2   • CHOLECYSTECTOMY, LAPAROSCOPIC N/A 2018   • JOINT REPLACEMENT      hip relacements bilateral   • JOINT REPLACEMENT  12    right hip   • NASAL

## 2024-03-14 NOTE — ANESTHESIA POSTPROCEDURE EVALUATION
Component                Value               Date/Time                  PROTIME                  15.9 (H)            11/23/2023 06:30 AM        INR                      1.27 (H)            11/23/2023 06:30 AM        APTT                     21.1 (L)            11/21/2023 12:02 AM     Intake & Output:  No intake/output data recorded.    Nausea & Vomiting:  No    Level of Consciousness:  Awake    Pain Assessment:  Adequate analgesia    Anesthesia Complications:  No apparent anesthetic complications    SUMMARY      Vital signs stable  OK to discharge from Stage I post anesthesia care.  Care transferred from Anesthesiology department on discharge from perioperative area       No notable events documented.

## 2024-03-14 NOTE — H&P
Pre-Sedation:  Pre-Sedation Documentation and Exam:  I have personally completed a history, physical exam & review of systems for this patient (see notes).    Prior History of Anesthesia Complications:   none    Modified Mallampati:  III (soft palate, base of uvula visible)    ASA Classification:  Class 2 - A normal healthy patient with mild systemic disease    Benjamin Scale:  Activity:  2 - Able to move 4 extremities voluntarily on command  Respiration:  2 - Able to breathe deeply and cough freely  Circulation:  2 - BP+/- 20mmHg of normal  Consciousness:  2 - Fully awake  Oxygen Saturation (color):  2 - Able to maintain oxygen saturation >92% on room air    Sedation/Anesthesia Plan:  Guard the patient's safety and welfare.  Minimize physical discomfort and pain.  Minimize negative psychological responses to treatment by providing sedation and analgesia and maximize the potential amnesia.  Patient to meet pre-procedure discharge plan.    Medication Planned:  Per anesthesia    Patient is an appropriate candidate for plan of sedation:   yes      Electronically signed by Nakul Roberts MD on 3/14/2024 at 8:43 AM

## 2024-03-14 NOTE — PERIOP NOTE
air with stable vitals. IV removed and patient getting dressed. DC instructions discussed with patient, her  and sister. Dr. Roberts discussed results with family. Questions answered. Wheelchair volunteer arrived and patient wheeled to front entry where family drove her home.

## 2024-03-14 NOTE — PROCEDURES
3/14/2024    PROCEDURE PERFORMED:     1. A Complete Transesophageal echocardiogram with color and spectral doppler and 3-D imaging was performed.     INDICATIONS: Pulmonic valve mass    PROCEDURE: The patient was brought to the lab in fasting state. The patient received adequate sedation with the assistance of anesthesia for the case. After ensuring adequate sedation, the esophagus was intubated and a complete transesophageal echocardiogram was completed. There were no complications related to the study.       CONCLUSION and PLAN:   Well-circumscribed, spherical mass measuring 0.5-0.6 cm, appears adherent to the pulmonic valve and is demonstrated in multiple views.  There is trace pulmonic regurgitation.  Differential diagnosis includes healed vegetation versus fibroblastoma versus less likely myxoma.  2.   Patient was counseled on results.  I am going to discuss this with Dr. Wheeler and expect to order cardiac MRI for further evaluation.  3.  Patient is currently taking anticoagulation for indication of atrial fibrillation.  This should be continued for stroke risk related to atrial fibrillation as well as thrombotic risk potentially associated with valvular mass.      See formal report for full details.          Nakul Roberts MD, [facility]   [phone] [facility]   [phone] [facility]

## 2024-03-14 NOTE — TELEPHONE ENCOUNTER
Dr. Roberts is requesting the patient be scheduled for a cardiac MRI for pulmonary valve mass.  Kimmy, MRI manager at [facility] notified and will schedule.

## 2024-03-15 PROCEDURE — 93325 DOPPLER ECHO COLOR FLOW MAPG: CPT | Performed by: INTERNAL MEDICINE

## 2024-03-15 PROCEDURE — 93312 ECHO TRANSESOPHAGEAL: CPT | Performed by: INTERNAL MEDICINE

## 2024-03-19 ENCOUNTER — TELEPHONE (OUTPATIENT)
Dept: CARDIOLOGY CLINIC | Age: 44
End: 2024-03-19

## 2024-03-19 DIAGNOSIS — D49.89 CARDIAC TUMOR: Primary | ICD-10-CM

## 2024-03-19 NOTE — TELEPHONE ENCOUNTER
Pt needs MRI per RJM, pt told by Central Scheduling that DR Wheeler at [facility] said pt's pacer NOT MRI compatible. Pt wants to know now what can she do. Please advise.

## 2024-03-19 NOTE — TELEPHONE ENCOUNTER
Spoke with patient.  Explained this is correct.  Her pacemaker is not compatible. The leads are, but the battery is not.  (Has 3 years left on battery).   This also confirmed by our device tech (Victoria).    Patient states that due to what was being seen on her PATRICE 3/14 (done by Dr. Roberts)  she needed to have an MRI sooner rather than later.   She is now very anxious due to not being able to have MRI.  Wanting to know what other options she has.     PATRICE - Summary   Normal left ventricular systolic function. Ejection fraction is visually   estimated at 55%.   Normal right ventricular size and function   Pacemaker / ICD lead is visualized in the right atrium/right ventricle -   normal appearance.   There is a well-circumscribed, spherical, 5 x 6 mm, mobile echogenic density   noted on pulmonic valve adjacent to the aortic annulus. No clear stalk is   seen. Differential includes healed vegetation versus tumor (consider   fibroelastoma, less likely myxoma). Trace pulmonic valve regurgitation   noted.   Small, predominantly anterior, pericardial effusion noted with no chamber   collapse.   Consider cardiac MRI for further evaluation.

## 2024-03-19 NOTE — TELEPHONE ENCOUNTER
Spoke with patient. Message relayed and VU.  She preferred to schedule so CS # given to patient.

## 2024-03-25 ENCOUNTER — HOSPITAL ENCOUNTER (OUTPATIENT)
Dept: CT IMAGING | Age: 44
Discharge: HOME | End: 2024-03-25
Attending: INTERNAL MEDICINE
Payer: MEDICARE

## 2024-03-25 DIAGNOSIS — D49.89 CARDIAC TUMOR: ICD-10-CM

## 2024-03-25 PROCEDURE — 71260 CT THORAX DX C+: CPT

## 2024-03-25 PROCEDURE — 6360000004 HC RX CONTRAST MEDICATION: Performed by: INTERNAL MEDICINE

## 2024-03-25 RX ADMIN — IOPAMIDOL 75 ML: 755 INJECTION, SOLUTION INTRAVENOUS at 15:46

## 2024-03-26 ENCOUNTER — TELEPHONE (OUTPATIENT)
Dept: CARDIOLOGY CLINIC | Age: 44
End: 2024-03-26

## 2024-03-26 DIAGNOSIS — I37.8 PULMONARY VALVE MASS: Primary | ICD-10-CM

## 2024-03-26 NOTE — TELEPHONE ENCOUNTER
Pt stated that she had a ct chest with contrast on 3/25/24. Pt is scheduled for 04/16/24 with kacie. She would like to know if she is able to come in sooner to see rjm to discuss results of scan. Please advise.

## 2024-03-28 NOTE — TELEPHONE ENCOUNTER
Attempted call again tonight and left 2nd  today. I'll connect with pt tomorrow. # for [facility] office given.

## 2024-03-29 NOTE — TELEPHONE ENCOUNTER
Called and spoke with pt. Pt stated she would like to wait to schedule ov till she scheduled Echo.

## 2024-03-29 NOTE — TELEPHONE ENCOUNTER
Spoke with patient today.   Plan for serial imaging.  Protected with anti-coagulant.  Plan for Echo in 6 months. I have placed order and she was given # to schedule.     Staff: Please cancel April appointment.  RTC in September - pls schedule OV      Nakul Roberts MD, [facility]  Cardiovascular Disease  [facility] [place]  [phone] [place] Office  [phone] [place] Office

## 2024-04-24 ENCOUNTER — OFFICE VISIT (OUTPATIENT)
Dept: CARDIOLOGY CLINIC | Age: 44
End: 2024-04-24
Payer: MEDICARE

## 2024-04-24 ENCOUNTER — NURSE ONLY (OUTPATIENT)
Dept: CARDIOLOGY CLINIC | Age: 44
End: 2024-04-24

## 2024-04-24 VITALS
WEIGHT: 224.7 LBS | DIASTOLIC BLOOD PRESSURE: 60 MMHG | HEART RATE: 81 BPM | HEIGHT: 64 IN | OXYGEN SATURATION: 96 % | SYSTOLIC BLOOD PRESSURE: 98 MMHG | BODY MASS INDEX: 38.36 KG/M2

## 2024-04-24 DIAGNOSIS — Z95.0 PACEMAKER: Primary | ICD-10-CM

## 2024-04-24 DIAGNOSIS — I47.10 SVT (SUPRAVENTRICULAR TACHYCARDIA) (HCC): ICD-10-CM

## 2024-04-24 DIAGNOSIS — I48.91 ATRIAL FIBRILLATION, UNSPECIFIED TYPE (HCC): ICD-10-CM

## 2024-04-24 PROCEDURE — G8417 CALC BMI ABV UP PARAM F/U: HCPCS

## 2024-04-24 PROCEDURE — 93000 ELECTROCARDIOGRAM COMPLETE: CPT

## 2024-04-24 PROCEDURE — G8427 DOCREV CUR MEDS BY ELIG CLIN: HCPCS

## 2024-04-24 PROCEDURE — 99214 OFFICE O/P EST MOD 30 MIN: CPT

## 2024-04-24 PROCEDURE — 1036F TOBACCO NON-USER: CPT

## 2024-04-24 RX ORDER — ACETAMINOPHEN AND CODEINE PHOSPHATE 120; 12 MG/5ML; MG/5ML
0.35 SOLUTION ORAL DAILY
COMMUNITY
Start: 2024-03-29

## 2024-04-24 NOTE — PROGRESS NOTES
11/21/2023 1.33 (H) 0.84 - 1.16 Final     Comment:     Effective 3/28/23 at 09:00am EST    Normal: 0.84 - 1.16  Therapeutic: 2.0 - 3.0  Pros. Valve: 2.5 - 3.5  AMI: 2.0 - 3.0       APTT: No results found for: \"PT2T\"  FASTING LIPID PANEL:   No results found for: \"HDL\", \"LDLDIRECT\", \"LDLCALC\", \"TRIG\"  LIVER PROFILE:No results for input(s): \"AST\", \"ALT\", \"ALB\" in the last 72 hours.    IMPRESSION:    Patient Active Problem List   Diagnosis    Tonsillar hypertrophy    Seasonal allergies    Myotonic muscular dystrophy (HCC)    Pacemaker    Mechanical complication of hip prosthesis ([facility])    Right THRevision of acetabulum    Gallstones    Diabetic ketoacidosis with coma associated with type 2 diabetes mellitus ([facility])    Acute non-recurrent maxillary sinusitis    Bandemia    DEREK (acute kidney injury) ([facility])    Class 1 obesity in adult    Acute encephalopathy    Secondary polycythemia    Diabetic acidosis ([facility])    Hypernatremia    SVT (supraventricular tachycardia) ([facility])    Cardiac murmur    Orthostatic dizziness    Pulmonic valve abscess       Assessment:   Paroxysmal atrial fibrillation   -[id number] score 3 --female, hypertension and diabetes  High-grade AV block -abnormal EP study 2004   -[employer] DC PPM  Orthostatic hypotension  Hypertension  Diabetes--Ozempic and insulin  Muscular dystrophy  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 Continue Xarelto 20 mg daily  2.  Continue Crestor 5 mg nightly  3.  Continue losartan 25 mg daily  4.  Start with remote device checks every 3 months--[employer] number given to patient  Schedule echo for August 2024---[email or web address]    Follow up in 6 months with Dr Fidel Power APRN-CNP  [facility] [place]  [phone]

## 2024-04-24 NOTE — PATIENT INSTRUCTIONS
1. Continue Xarelto 20 mg daily  2.  Continue Crestor 5 mg nightly  3.  Continue losartan 25 mg daily  4.  Start with remote device checks  Schedule echo for August 2024---[email or web address]    Follow up in 6 months with Dr Parra

## 2024-05-08 ENCOUNTER — TELEPHONE (OUTPATIENT)
Dept: CARDIOLOGY CLINIC | Age: 44
End: 2024-05-08

## 2024-05-08 ENCOUNTER — NURSE ONLY (OUTPATIENT)
Dept: CARDIOLOGY CLINIC | Age: 44
End: 2024-05-08

## 2024-05-08 NOTE — TELEPHONE ENCOUNTER
Pt stated that she spoke with [employer] regarding her transmitter not being able to send transmissions. [employer] advised pt that her serial number is off by one number and that the serial number will need to be updated to be able to use her transmitter. Pt was advised by [employer] to call the office. Please advise.

## 2024-05-08 NOTE — TELEPHONE ENCOUNTER
Spoke with patient and let her know everything has been corrected. She said she will send a transmission to make sure everything is working correctly. She states that her ID card also has the incorrect serial number on it. I let her know I will work on getting this corrected w/ [employer] and will have them send a new ID card. Spoke w/ [employer] rep and she is updating info and sending an updated card.

## 2024-05-08 NOTE — TELEPHONE ENCOUNTER
Judie Moran Katelyn; [facility]; Victoria Omer, MA26 minutes ago (12:29 PM)     LR  Corrected in carelink/ pt should be able to send transmission now. Thanks.         M for patient to relay message.

## 2024-05-09 ENCOUNTER — PATIENT MESSAGE (OUTPATIENT)
Dept: CARDIOLOGY CLINIC | Age: 44
End: 2024-05-09

## 2024-05-09 NOTE — TELEPHONE ENCOUNTER
[facility] [phone]  [place] [phone]  [place] [phone]  [place] [phone]    Pacemaker/Defibrillator Clinic        05/09/24        Priya Foster  1966 [facility] [address]      Dear Priya Foster    This letter is to inform you that we received the transmission from your monitor at home that checks your implanted heart device. The next date you should transmit will be 8/6/2024. If your report requires attention, we will notify you. Please be aware that the remote device transmission sites are periodically monitored only during regular business hours during which simultaneous in-office device clinics are being run. If your transmission requires attention, we will contact you as soon as possible.     **PLEASE NOTE** that our [facility] Device [facility] policy and processes are changing to ensure a more seamless approach for all parties involved, allowing more time for our nurses to address patient issues and concerns. We will no longer be sending letters for NORMAL remote transmissions. You will be contacted by phone if your transmission requires attention (as previously done), and letters will only be sent regarding monitor disconnections or missed transmissions if you are unable to be reached by phone. Please do not be alarmed by this new process, as we will continue to contact you when you are due for your transmission AND/OR if your transmission report requires attention. This will be your final “NORMAL remote received” letter. From this point forward, the [facility] Device Clinic will be utilizing the “no news is good news” approach. As always, please feel free to contact your nurse with any questions or concerns.    Thank you.      [facility]

## 2024-05-22 ENCOUNTER — PATIENT MESSAGE (OUTPATIENT)
Dept: CARDIOLOGY CLINIC | Age: 44
End: 2024-05-22

## 2024-05-22 NOTE — TELEPHONE ENCOUNTER
[facility] [phone]  [place] [phone]  [place] [phone]  [place] [phone]    Pacemaker/Defibrillator Clinic        05/22/24        Priya Foster  1966 [facility] [address]      Dear Priya Foster    This letter is to inform you that we received the transmission from your monitor at home that checks your implanted heart device. The next date you should transmit will be 8/6/2024. If your report requires attention, we will notify you. Please be aware that the remote device transmission sites are periodically monitored only during regular business hours during which simultaneous in-office device clinics are being run. If your transmission requires attention, we will contact you as soon as possible.     **PLEASE NOTE** that our [facility] Device Clinic policy and processes are changing to ensure a more seamless approach for all parties involved, allowing more time for our nurses to address patient issues and concerns. We will no longer be sending letters for NORMAL remote transmissions. You will be contacted by phone if your transmission requires attention (as previously done), and letters will only be sent regarding monitor disconnections or missed transmissions if you are unable to be reached by phone. Please do not be alarmed by this new process, as we will continue to contact you when you are due for your transmission AND/OR if your transmission report requires attention. This will be your final “NORMAL remote received” letter. From this point forward, the [facility] Device Clinic will be utilizing the “no news is good news” approach. As always, please feel free to contact your nurse with any questions or concerns.    Thank you.      [facility]

## 2024-08-02 ENCOUNTER — TELEPHONE (OUTPATIENT)
Dept: CARDIOLOGY CLINIC | Age: 44
End: 2024-08-02

## 2024-08-02 NOTE — TELEPHONE ENCOUNTER
Please help me generate a letter.  Patient at low risk for MACE during a low risk procedure.  No further cardiac testing indicated.  Ok to hold OAC however while off OAC risk for CVA increases however risk benefits favor holding.  Hold 4 days prior and restart 2 days post procedure.  Please make sure patient aware.

## 2024-08-02 NOTE — TELEPHONE ENCOUNTER
CARDIAC CLEARANCE REQUEST    What type of procedure are you having:  Kidney Stone Removal with Electroids  Are you taking any blood thinners:  Xarelto   Type on anesthesia:  Unknown  When is your procedure scheduled for:  08.20.24  What physician is performing your procedure:  Dr. Ananya Padilla- [facility]  Phone Number:  [phone]  Fax number to send the letter:   [phone]

## 2024-08-06 ENCOUNTER — NURSE ONLY (OUTPATIENT)
Dept: CARDIOLOGY CLINIC | Age: 44
End: 2024-08-06

## 2024-08-08 ENCOUNTER — TELEPHONE (OUTPATIENT)
Dept: CARDIOLOGY CLINIC | Age: 44
End: 2024-08-08

## 2024-08-08 DIAGNOSIS — I48.91 ATRIAL FIBRILLATION, UNSPECIFIED TYPE (HCC): ICD-10-CM

## 2024-09-13 ENCOUNTER — TELEPHONE (OUTPATIENT)
Dept: CARDIOLOGY CLINIC | Age: 44
End: 2024-09-13

## 2024-09-20 ENCOUNTER — HOSPITAL ENCOUNTER (OUTPATIENT)
Dept: CARDIOLOGY | Age: 44
Discharge: HOME | End: 2024-09-22
Attending: INTERNAL MEDICINE
Payer: MEDICARE

## 2024-09-20 VITALS
WEIGHT: 224 LBS | DIASTOLIC BLOOD PRESSURE: 60 MMHG | SYSTOLIC BLOOD PRESSURE: 100 MMHG | HEIGHT: 64 IN | BODY MASS INDEX: 38.24 KG/M2

## 2024-09-20 DIAGNOSIS — I37.8 PULMONARY VALVE MASS: ICD-10-CM

## 2024-09-20 PROCEDURE — 93306 TTE W/DOPPLER COMPLETE: CPT

## 2024-09-21 LAB
ECHO AO ASC DIAM: 3.6 CM
ECHO AO ASCENDING AORTA INDEX: 1.76 CM/M2
ECHO AO ROOT DIAM: 2.7 CM
ECHO AO ROOT INDEX: 1.32 CM/M2
ECHO AV CUSP MM: 2 CM
ECHO AV PEAK GRADIENT: 8 MMHG
ECHO AV PEAK GRADIENT: 8 MMHG
ECHO AV PEAK VELOCITY: 1.4 M/S
ECHO AV VELOCITY RATIO: 0.64
ECHO BSA: 2.14 M2
ECHO LA AREA 4C: 16.9 CM2
ECHO LA DIAMETER INDEX: 1.85 CM/M2
ECHO LA DIAMETER: 3.8 CM
ECHO LA MAJOR AXIS: 5.6 CM
ECHO LA TO AORTIC ROOT RATIO: 1.41
ECHO LA VOL MOD A4C: 41 ML (ref 22–52)
ECHO LA VOLUME INDEX MOD A4C: 20 ML/M2 (ref 16–34)
ECHO LV E' LATERAL VELOCITY: 16.1 CM/S
ECHO LV E' SEPTAL VELOCITY: 9.6 CM/S
ECHO LV EF PHYSICIAN: 55 %
ECHO LV FRACTIONAL SHORTENING: 43 % (ref 28–44)
ECHO LV GLOBAL LONGITUDINAL STRAIN (GLS): -15 %
ECHO LV GLOBAL LONGITUDINAL STRAIN (GLS): -18 %
ECHO LV GLOBAL LONGITUDINAL STRAIN (GLS): -18.3 %
ECHO LV GLOBAL LONGITUDINAL STRAIN (GLS): -20.7 %
ECHO LV INTERNAL DIMENSION DIASTOLE INDEX: 2.29 CM/M2
ECHO LV INTERNAL DIMENSION DIASTOLIC: 4.7 CM (ref 3.9–5.3)
ECHO LV INTERNAL DIMENSION SYSTOLIC INDEX: 1.32 CM/M2
ECHO LV INTERNAL DIMENSION SYSTOLIC: 2.7 CM
ECHO LV ISOVOLUMETRIC RELAXATION TIME (IVRT): 71 MS
ECHO LV IVSD: 0.9 CM (ref 0.6–0.9)
ECHO LV MASS 2D: 132.3 G (ref 67–162)
ECHO LV MASS INDEX 2D: 64.5 G/M2 (ref 43–95)
ECHO LV POSTERIOR WALL DIASTOLIC: 0.8 CM (ref 0.6–0.9)
ECHO LV RELATIVE WALL THICKNESS RATIO: 0.34
ECHO LVOT PEAK GRADIENT: 3 MMHG
ECHO LVOT PEAK VELOCITY: 0.9 M/S
ECHO MV A VELOCITY: 0.82 M/S
ECHO MV E DECELERATION TIME (DT): 222 MS
ECHO MV E VELOCITY: 0.65 M/S
ECHO MV E/A RATIO: 0.79
ECHO MV E/E' LATERAL: 4.04
ECHO MV E/E' RATIO (AVERAGED): 5.4
ECHO MV E/E' SEPTAL: 6.77
ECHO RA AREA 4C: 12.5 CM2
ECHO RA END SYSTOLIC VOLUME APICAL 4 CHAMBER INDEX BSA: 12 ML/M2
ECHO RA VOLUME: 24 ML
ECHO RV FREE WALL PEAK S': 11.9 CM/S
ECHO RV TAPSE: 2.1 CM (ref 1.7–?)

## 2024-09-21 PROCEDURE — 93306 TTE W/DOPPLER COMPLETE: CPT | Performed by: INTERNAL MEDICINE

## 2024-09-21 PROCEDURE — 93356 MYOCRD STRAIN IMG SPCKL TRCK: CPT | Performed by: INTERNAL MEDICINE

## 2024-10-22 ENCOUNTER — OFFICE VISIT (OUTPATIENT)
Dept: CARDIOLOGY CLINIC | Age: 44
End: 2024-10-22
Payer: MEDICARE

## 2024-10-22 ENCOUNTER — NURSE ONLY (OUTPATIENT)
Dept: CARDIOLOGY CLINIC | Age: 44
End: 2024-10-22

## 2024-10-22 VITALS
DIASTOLIC BLOOD PRESSURE: 76 MMHG | OXYGEN SATURATION: 98 % | WEIGHT: 225 LBS | BODY MASS INDEX: 38.41 KG/M2 | SYSTOLIC BLOOD PRESSURE: 118 MMHG | HEART RATE: 78 BPM | HEIGHT: 64 IN

## 2024-10-22 DIAGNOSIS — Z95.0 PACEMAKER: Primary | ICD-10-CM

## 2024-10-22 DIAGNOSIS — I47.10 SVT (SUPRAVENTRICULAR TACHYCARDIA) (HCC): ICD-10-CM

## 2024-10-22 PROCEDURE — 1036F TOBACCO NON-USER: CPT | Performed by: INTERNAL MEDICINE

## 2024-10-22 PROCEDURE — G8484 FLU IMMUNIZE NO ADMIN: HCPCS | Performed by: INTERNAL MEDICINE

## 2024-10-22 PROCEDURE — G8427 DOCREV CUR MEDS BY ELIG CLIN: HCPCS | Performed by: INTERNAL MEDICINE

## 2024-10-22 PROCEDURE — G8417 CALC BMI ABV UP PARAM F/U: HCPCS | Performed by: INTERNAL MEDICINE

## 2024-10-22 PROCEDURE — 93000 ELECTROCARDIOGRAM COMPLETE: CPT | Performed by: INTERNAL MEDICINE

## 2024-10-22 NOTE — PROGRESS NOTES
and normal. regular S1 and S2.  Jugular venous pulsation Normal  The carotid upstroke is normal in amplitude and contour without delay or bruit  Peripheral pulses are symmetrical and full  Abdomen:  No masses or tenderness  Bowel sounds present  Extremities:   No Cyanosis or Clubbing   Lower extremity edema: No  Skin: Warm and dry  Neurological:  Alert and oriented.  Moves all extremities well  No abnormalities of mood, affect, memory, mentation, or behavior are noted    DATA:    ECG 10/22/24: Personally reviewed.     Echo 9/2024    Image quality is adequate.    Left Ventricle: Normal left ventricular systolic function with a visually estimated EF of 55 - 60%. Left ventricle size is normal. Normal wall thickness. Normal wall motion. Normal diastolic function.    Right Ventricle: Right ventricle size is normal. Pacemaker lead present in the right ventricle. Normal systolic function.    Tricuspid Valve: Trace regurgitation. Unable to assess RVSP due to inadequate or insignificant tricuspid regurgitation.    Pulmonic Valve: The pulmonic valve visualization is suboptimal but appears to be functioning normally. Mild regurgitation. No stenosis noted. There is a 5-6mm small mass present  echogenic and spherical on the left cusp.This is consistent with papillary fibroelastoma.    IMPRESSION:    Newly diagnosed atrial fibrillation ([id number] score 3).  1/9/2024  Patient is a pleasant 43-year-old female with a medical history significant for t diabetes mellitus type 2, hypertension, and osteoarthritis who presents from home to [facility] care.  Patient has had some recurrent episodes of atrial fibrillation.  Her most recent episode happened when she was in DKA and lasted for approximately 27 hours.  Patient was encephalopathic and is unclear if was symptomatic at the time.  She had some RVR during her atrial fibrillation.  We discussed anticoagulation (NOAC and warfarin), rate control, rhythm control, AVN + pacemaker, and

## 2024-10-22 NOTE — PATIENT INSTRUCTIONS
RECOMMENDATIONS:  Remote device transmissions every 3 months.   Ok to proceed with surgery.   Continue current medications.   Follow up in one year with EPNP.

## 2024-11-05 ENCOUNTER — NURSE ONLY (OUTPATIENT)
Dept: CARDIOLOGY CLINIC | Age: 44
End: 2024-11-05

## 2024-11-11 PROCEDURE — 93296 REM INTERROG EVL PM/IDS: CPT | Performed by: INTERNAL MEDICINE

## 2024-11-11 PROCEDURE — 93294 REM INTERROG EVL PM/LDLS PM: CPT | Performed by: INTERNAL MEDICINE

## 2024-12-26 ENCOUNTER — APPOINTMENT (OUTPATIENT)
Dept: CT IMAGING | Age: 44
End: 2024-12-26
Payer: MEDICARE

## 2024-12-26 ENCOUNTER — HOSPITAL ENCOUNTER (EMERGENCY)
Age: 44
Discharge: HOME | End: 2024-12-26
Payer: MEDICARE

## 2024-12-26 VITALS
DIASTOLIC BLOOD PRESSURE: 72 MMHG | BODY MASS INDEX: 36.95 KG/M2 | RESPIRATION RATE: 18 BRPM | TEMPERATURE: 98.2 F | HEART RATE: 70 BPM | WEIGHT: 215.4 LBS | OXYGEN SATURATION: 97 % | SYSTOLIC BLOOD PRESSURE: 108 MMHG

## 2024-12-26 DIAGNOSIS — N20.1 URETEROLITHIASIS: Primary | ICD-10-CM

## 2024-12-26 DIAGNOSIS — R10.9 LEFT FLANK PAIN: ICD-10-CM

## 2024-12-26 DIAGNOSIS — R31.9 HEMATURIA, UNSPECIFIED TYPE: ICD-10-CM

## 2024-12-26 LAB
ALBUMIN SERPL-MCNC: 4.2 G/DL (ref 3.4–5)
ALBUMIN/GLOB SERPL: 1.5 {RATIO} (ref 1.1–2.2)
ALP SERPL-CCNC: 114 U/L (ref 40–129)
ALT SERPL-CCNC: 39 U/L (ref 10–40)
ANION GAP SERPL CALCULATED.3IONS-SCNC: 12 MMOL/L (ref 3–16)
AST SERPL-CCNC: 31 U/L (ref 15–37)
BASOPHILS # BLD: 0 K/UL (ref 0–0.2)
BASOPHILS NFR BLD: 0.5 %
BILIRUB SERPL-MCNC: 0.5 MG/DL (ref 0–1)
BILIRUB UR QL STRIP.AUTO: ABNORMAL
BUN SERPL-MCNC: 18 MG/DL (ref 7–20)
CALCIUM SERPL-MCNC: 9.8 MG/DL (ref 8.3–10.6)
CHLORIDE SERPL-SCNC: 101 MMOL/L (ref 99–110)
CLARITY UR: CLEAR
CO2 SERPL-SCNC: 23 MMOL/L (ref 21–32)
COLOR UR: YELLOW
CREAT SERPL-MCNC: 0.8 MG/DL (ref 0.6–1.1)
DEPRECATED RDW RBC AUTO: 13.6 % (ref 12.4–15.4)
EOSINOPHIL # BLD: 0 K/UL (ref 0–0.6)
EOSINOPHIL NFR BLD: 0.5 %
EPI CELLS #/AREA URNS HPF: ABNORMAL /HPF (ref 0–5)
GFR SERPLBLD CREATININE-BSD FMLA CKD-EPI: >90 ML/MIN/{1.73_M2}
GLUCOSE SERPL-MCNC: 280 MG/DL (ref 70–99)
GLUCOSE UR STRIP.AUTO-MCNC: NEGATIVE MG/DL
HCT VFR BLD AUTO: 39.8 % (ref 36–48)
HGB BLD-MCNC: 13.3 G/DL (ref 12–16)
HGB UR QL STRIP.AUTO: ABNORMAL
KETONES UR STRIP.AUTO-MCNC: NEGATIVE MG/DL
LEUKOCYTE ESTERASE UR QL STRIP.AUTO: NEGATIVE
LIPASE SERPL-CCNC: 32 U/L (ref 13–60)
LYMPHOCYTES # BLD: 2.3 K/UL (ref 1–5.1)
LYMPHOCYTES NFR BLD: 28.8 %
MCH RBC QN AUTO: 29.5 PG (ref 26–34)
MCHC RBC AUTO-ENTMCNC: 33.5 G/DL (ref 31–36)
MCV RBC AUTO: 87.9 FL (ref 80–100)
MONOCYTES # BLD: 0.4 K/UL (ref 0–1.3)
MONOCYTES NFR BLD: 5.3 %
NEUTROPHILS # BLD: 5.1 K/UL (ref 1.7–7.7)
NEUTROPHILS NFR BLD: 64.9 %
NITRITE UR QL STRIP.AUTO: NEGATIVE
PH UR STRIP.AUTO: 5.5 [PH] (ref 5–8)
PLATELET # BLD AUTO: 251 K/UL (ref 135–450)
PMV BLD AUTO: 7.8 FL (ref 5–10.5)
POTASSIUM SERPL-SCNC: 3.9 MMOL/L (ref 3.5–5.1)
PROT SERPL-MCNC: 7 G/DL (ref 6.4–8.2)
PROT UR STRIP.AUTO-MCNC: 100 MG/DL
RBC # BLD AUTO: 4.52 M/UL (ref 4–5.2)
RBC #/AREA URNS HPF: >100 /HPF (ref 0–4)
SODIUM SERPL-SCNC: 136 MMOL/L (ref 136–145)
SP GR UR STRIP.AUTO: >=1.03 (ref 1–1.03)
UA COMPLETE W REFLEX CULTURE PNL UR: ABNORMAL
UA DIPSTICK W REFLEX MICRO PNL UR: YES
URN SPEC COLLECT METH UR: ABNORMAL
UROBILINOGEN UR STRIP-ACNC: 0.2 E.U./DL
WBC # BLD AUTO: 7.9 K/UL (ref 4–11)
WBC #/AREA URNS HPF: ABNORMAL /HPF (ref 0–5)

## 2024-12-26 PROCEDURE — 74177 CT ABD & PELVIS W/CONTRAST: CPT

## 2024-12-26 PROCEDURE — 80053 COMPREHEN METABOLIC PANEL: CPT

## 2024-12-26 PROCEDURE — 6370000000 HC RX 637 (ALT 250 FOR IP)

## 2024-12-26 PROCEDURE — 85025 COMPLETE CBC W/AUTO DIFF WBC: CPT

## 2024-12-26 PROCEDURE — 81001 URINALYSIS AUTO W/SCOPE: CPT

## 2024-12-26 PROCEDURE — 6360000002 HC RX W HCPCS

## 2024-12-26 PROCEDURE — 96374 THER/PROPH/DIAG INJ IV PUSH: CPT

## 2024-12-26 PROCEDURE — 83690 ASSAY OF LIPASE: CPT

## 2024-12-26 PROCEDURE — 2580000003 HC RX 258

## 2024-12-26 PROCEDURE — 6360000004 HC RX CONTRAST MEDICATION

## 2024-12-26 PROCEDURE — 99285 EMERGENCY DEPT VISIT HI MDM: CPT

## 2024-12-26 PROCEDURE — 36415 COLL VENOUS BLD VENIPUNCTURE: CPT

## 2024-12-26 PROCEDURE — 96375 TX/PRO/DX INJ NEW DRUG ADDON: CPT

## 2024-12-26 RX ORDER — IOPAMIDOL 755 MG/ML
75 INJECTION, SOLUTION INTRAVASCULAR
Status: COMPLETED | OUTPATIENT
Start: 2024-12-26 | End: 2024-12-26

## 2024-12-26 RX ORDER — ONDANSETRON 2 MG/ML
4 INJECTION INTRAMUSCULAR; INTRAVENOUS ONCE
Status: COMPLETED | OUTPATIENT
Start: 2024-12-26 | End: 2024-12-26

## 2024-12-26 RX ORDER — KETOROLAC TROMETHAMINE 30 MG/ML
15 INJECTION, SOLUTION INTRAMUSCULAR; INTRAVENOUS ONCE
Status: DISCONTINUED | OUTPATIENT
Start: 2024-12-26 | End: 2024-12-26

## 2024-12-26 RX ORDER — 0.9 % SODIUM CHLORIDE 0.9 %
500 INTRAVENOUS SOLUTION INTRAVENOUS ONCE
Status: COMPLETED | OUTPATIENT
Start: 2024-12-26 | End: 2024-12-26

## 2024-12-26 RX ORDER — TAMSULOSIN HYDROCHLORIDE 0.4 MG/1
0.4 CAPSULE ORAL ONCE
Status: COMPLETED | OUTPATIENT
Start: 2024-12-26 | End: 2024-12-26

## 2024-12-26 RX ORDER — TAMSULOSIN HYDROCHLORIDE 0.4 MG/1
0.4 CAPSULE ORAL DAILY
Qty: 5 CAPSULE | Refills: 0 | Status: SHIPPED | OUTPATIENT
Start: 2024-12-26 | End: 2024-12-31

## 2024-12-26 RX ORDER — HYDROCODONE BITARTRATE AND ACETAMINOPHEN 5; 325 MG/1; MG/1
1 TABLET ORAL
Qty: 20 TABLET | Refills: 0 | Status: SHIPPED | OUTPATIENT
Start: 2024-12-26 | End: 2024-12-31

## 2024-12-26 RX ORDER — MORPHINE SULFATE 4 MG/ML
4 INJECTION, SOLUTION INTRAMUSCULAR; INTRAVENOUS
Status: COMPLETED | OUTPATIENT
Start: 2024-12-26 | End: 2024-12-26

## 2024-12-26 RX ORDER — ONDANSETRON 4 MG/1
4 TABLET, ORALLY DISINTEGRATING ORAL 3 TIMES DAILY PRN
Qty: 21 TABLET | Refills: 0 | Status: SHIPPED | OUTPATIENT
Start: 2024-12-26

## 2024-12-26 RX ADMIN — MORPHINE SULFATE 4 MG: 4 INJECTION, SOLUTION INTRAMUSCULAR; INTRAVENOUS at 09:05

## 2024-12-26 RX ADMIN — SODIUM CHLORIDE 500 ML: 9 INJECTION, SOLUTION INTRAVENOUS at 09:05

## 2024-12-26 RX ADMIN — IOPAMIDOL 75 ML: 755 INJECTION, SOLUTION INTRAVENOUS at 10:04

## 2024-12-26 RX ADMIN — ONDANSETRON 4 MG: 2 INJECTION, SOLUTION INTRAMUSCULAR; INTRAVENOUS at 09:05

## 2024-12-26 RX ADMIN — TAMSULOSIN HYDROCHLORIDE 0.4 MG: 0.4 CAPSULE ORAL at 12:20

## 2024-12-26 ASSESSMENT — PAIN SCALES - GENERAL
PAINLEVEL_OUTOF10: 10
PAINLEVEL_OUTOF10: 10
PAINLEVEL_OUTOF10: 0

## 2024-12-26 ASSESSMENT — PAIN - FUNCTIONAL ASSESSMENT: PAIN_FUNCTIONAL_ASSESSMENT: 0-10

## 2024-12-26 ASSESSMENT — PAIN DESCRIPTION - LOCATION: LOCATION: FLANK

## 2024-12-26 ASSESSMENT — PAIN DESCRIPTION - ORIENTATION: ORIENTATION: LEFT

## 2024-12-26 NOTE — ED PROVIDER NOTES
COURSE/DDx/MDM  Vitals:  Vitals:    12/26/24 0932 12/26/24 1025 12/26/24 1033 12/26/24 1134   BP: 127/79  108/69 108/72   Pulse: 78  72 70   Resp: 16 16 18   Temp:       TempSrc:       SpO2: 98% 98% 93% 97%   Weight:         Patient received following medications in ED:  Medications   ondansetron (ZOFRAN) injection 4 mg (4 mg IntraVENous Given 12/26/24 0905)   sodium chloride 0.9 % bolus 500 mL (0 mLs IntraVENous Stopped 12/26/24 1013)   morphine sulfate (PF) injection 4 mg (4 mg IntraVENous Given 12/26/24 0905)   iopamidol (ISOVUE-370) 76 % injection 75 mL (75 mLs IntraVENous Given 12/26/24 1004)   tamsulosin (FLOMAX) capsule 0.4 mg (0.4 mg Oral Given 12/26/24 1220)       Chronic conditions affecting care:    has a past medical history of DEREK (acute kidney injury) ([facility]) (11/21/2023), Allergic rhinitis, Alopecia, Asthma, Encounter for imaging to screen for metal prior to MRI (03/19/2024), Fatigue, GERD (gastroesophageal reflux disease), Mechanical complication of hip prosthesis ([facility]) (08/21/2012), Morbid obesity, Myotonic muscular dystrophy ([facility]), and SVT (supraventricular tachycardia) ([facility]) (12/19/2023).      Reassessment:   ED Course as of 12/26/24 1622   Thu Dec 26, 2024   1220 Patient reevaluated.  Patient continues to remain stable here in the ED.  Patient's left flank pain is currently gone.  Patient states that she feels fine.  Patient is currently tolerating p.o.  Patient CT of the abdomen pelvis does reveal 4 mm left distal ureteral calculus with moderate hydronephrosis.  Patient's kidney function is normal at this time with creatinine of 0.8 and GFR greater than 90.  Patient's urinalysis does show hematuria however does not suggest any signs consistent with UTI.  Patient's white blood cell count is normal at 7.9.  Patient CBC is otherwise normal.  Patient's CMP shows glucose of 280 however is otherwise normal.  Patient's lipase is normal at 32.  Patient's vital signs are normal.  Patient does have a

## 2025-02-04 ENCOUNTER — NURSE ONLY (OUTPATIENT)
Dept: CARDIOLOGY CLINIC | Age: 45
End: 2025-02-04

## 2025-03-20 DIAGNOSIS — I48.91 ATRIAL FIBRILLATION, UNSPECIFIED TYPE (HCC): ICD-10-CM

## 2025-03-20 NOTE — TELEPHONE ENCOUNTER
Pt called for a refill for rivaroxaban (XARELTO) 20 MG TABS tablet   Please send to   [facility] [id number] - [place], [place] - [address] - P [phone] - F [phone] [[id number]]    LOV 10/22/2024 IDALIA BARRIOS 10/22/2025 [place]

## 2025-03-24 ENCOUNTER — TELEPHONE (OUTPATIENT)
Dept: CARDIOLOGY CLINIC | Age: 45
End: 2025-03-24

## 2025-03-24 DIAGNOSIS — I48.91 ATRIAL FIBRILLATION, UNSPECIFIED TYPE (HCC): Primary | ICD-10-CM

## 2025-03-24 DIAGNOSIS — Z79.899 MEDICATION MANAGEMENT: ICD-10-CM

## 2025-03-24 NOTE — TELEPHONE ENCOUNTER
Pt prescribed rivaroxaban (XARELTO) 20 MG TABS tablet but she can not afford at 400.00 dollars. Is there less expensive medication. Please advise.

## 2025-04-04 RX ORDER — WARFARIN SODIUM 5 MG/1
TABLET ORAL
Qty: 30 TABLET | Refills: 3 | Status: SHIPPED | OUTPATIENT
Start: 2025-04-04 | End: 2025-04-04 | Stop reason: ALTCHOICE

## 2025-04-04 NOTE — TELEPHONE ENCOUNTER
Pt returned call. Message given. Pt stated that she spoke with her insurance company and that after she pays her deductible the Xarelto will cost $47. Pt would like to continue with Xarelto 20mg and would like it called into [facility] since she is out.    [facility] [id number] - [place], [place] - [address] [place] [address] -  [phone]

## 2025-04-04 NOTE — TELEPHONE ENCOUNTER
LVM for patient to inform her [facility] was agreeable to medication change. Need to inform patient that Warfarin was sent into pharmacy ([facility] in [place]) and also that she needs to establish with the anticoag clinic for management of this medication-referral has been placed. Patient needs appointment with anticoag clinic within 2-3 days of starting Warfarin. Thanks

## 2025-04-04 NOTE — TELEPHONE ENCOUNTER
RX for warfarin discontinued. Sent in RX for Xarelto 20mg per patient request.     Only sending to [facility] to inform of updated plan.

## 2025-05-06 ENCOUNTER — CLINICAL SUPPORT (OUTPATIENT)
Dept: CARDIOLOGY CLINIC | Age: 45
End: 2025-05-06

## 2025-05-19 PROCEDURE — 93296 REM INTERROG EVL PM/IDS: CPT | Performed by: INTERNAL MEDICINE

## 2025-05-19 PROCEDURE — 93294 REM INTERROG EVL PM/LDLS PM: CPT | Performed by: INTERNAL MEDICINE

## 2025-08-05 ENCOUNTER — CLINICAL SUPPORT (OUTPATIENT)
Dept: CARDIOLOGY CLINIC | Age: 45
End: 2025-08-05